# Patient Record
Sex: FEMALE
[De-identification: names, ages, dates, MRNs, and addresses within clinical notes are randomized per-mention and may not be internally consistent; named-entity substitution may affect disease eponyms.]

---

## 2020-04-08 ENCOUNTER — HOSPITAL ENCOUNTER (EMERGENCY)
Dept: HOSPITAL 5 - ED | Age: 50
Discharge: HOME | End: 2020-04-08
Payer: COMMERCIAL

## 2020-04-08 VITALS — SYSTOLIC BLOOD PRESSURE: 156 MMHG | DIASTOLIC BLOOD PRESSURE: 102 MMHG

## 2020-04-08 DIAGNOSIS — R42: ICD-10-CM

## 2020-04-08 DIAGNOSIS — E78.5: ICD-10-CM

## 2020-04-08 DIAGNOSIS — J18.9: Primary | ICD-10-CM

## 2020-04-08 DIAGNOSIS — I10: ICD-10-CM

## 2020-04-08 DIAGNOSIS — F17.200: ICD-10-CM

## 2020-04-08 DIAGNOSIS — R00.0: ICD-10-CM

## 2020-04-08 LAB
ALBUMIN SERPL-MCNC: 4.1 G/DL (ref 3.9–5)
ALT SERPL-CCNC: 74 UNITS/L (ref 7–56)
BASOPHILS # (AUTO): 0.1 K/MM3 (ref 0–0.1)
BASOPHILS NFR BLD AUTO: 0.7 % (ref 0–1.8)
BUN SERPL-MCNC: 9 MG/DL (ref 7–17)
BUN/CREAT SERPL: 13 %
CALCIUM SERPL-MCNC: 9.7 MG/DL (ref 8.4–10.2)
EOSINOPHIL # BLD AUTO: 0 K/MM3 (ref 0–0.4)
EOSINOPHIL NFR BLD AUTO: 0.3 % (ref 0–4.3)
HCT VFR BLD CALC: 48.8 % (ref 30.3–42.9)
HEMOLYSIS INDEX: 7
HGB BLD-MCNC: 16.6 GM/DL (ref 10.1–14.3)
LYMPHOCYTES # BLD AUTO: 2.2 K/MM3 (ref 1.2–5.4)
LYMPHOCYTES NFR BLD AUTO: 22.6 % (ref 13.4–35)
MCHC RBC AUTO-ENTMCNC: 34 % (ref 30–34)
MCV RBC AUTO: 99 FL (ref 79–97)
MONOCYTES # (AUTO): 0.5 K/MM3 (ref 0–0.8)
MONOCYTES % (AUTO): 5.6 % (ref 0–7.3)
PLATELET # BLD: 206 K/MM3 (ref 140–440)
RBC # BLD AUTO: 4.92 M/MM3 (ref 3.65–5.03)

## 2020-04-08 PROCEDURE — 84484 ASSAY OF TROPONIN QUANT: CPT

## 2020-04-08 PROCEDURE — 80053 COMPREHEN METABOLIC PANEL: CPT

## 2020-04-08 PROCEDURE — 93010 ELECTROCARDIOGRAM REPORT: CPT

## 2020-04-08 PROCEDURE — 71275 CT ANGIOGRAPHY CHEST: CPT

## 2020-04-08 PROCEDURE — 71046 X-RAY EXAM CHEST 2 VIEWS: CPT

## 2020-04-08 PROCEDURE — 96360 HYDRATION IV INFUSION INIT: CPT

## 2020-04-08 PROCEDURE — 99284 EMERGENCY DEPT VISIT MOD MDM: CPT

## 2020-04-08 PROCEDURE — 85025 COMPLETE CBC W/AUTO DIFF WBC: CPT

## 2020-04-08 PROCEDURE — 96361 HYDRATE IV INFUSION ADD-ON: CPT

## 2020-04-08 PROCEDURE — 36415 COLL VENOUS BLD VENIPUNCTURE: CPT

## 2020-04-08 PROCEDURE — 93005 ELECTROCARDIOGRAM TRACING: CPT

## 2020-04-08 NOTE — XRAY REPORT
CHEST 2 VIEWS 



INDICATION: 

SOB, cough.



COMPARISON: 

None.



FINDINGS:

Support devices: None.



Heart: Within normal limits. 

Pulmonary vasculature: Normal.

Lungs/pleura: A very subtle wedge-shaped left lower lobe infiltrate with air bronchograms which is be
tter seen on the frontal view. The lungs are otherwise clear. No pleural effusion.  No pneumothorax.



Additional findings: None.



IMPRESSION:

1. A subtle left lower lobe pneumonia partial consolidation.



Signer Name: Alexi Jordan MD 

Signed: 4/8/2020 10:33 AM

Workstation Name: [id number]

## 2020-04-08 NOTE — CAT SCAN REPORT
CTA CHEST WITH IV CONTRAST



INDICATION:

MAIN: chest pain and tachycardia 100CC [id number].



TECHNIQUE:

Axial CT images were obtained through the chest after injection of IV contrast. 3 plane MIP reconstru
ctions were produced. All CT scans at this location are performed using CT dose reduction for ALARA b
y means of automated exposure control. 



COMPARISON:

None available.



FINDINGS:

Pulmonary Arteries: No pulmonary emboli.

Thoracic Aorta: No acute abnormality.

Heart: Normal.

Lungs: No acute air space or interstitial disease. 

Pleura: No pleural effusion. No pneumothorax.

Lymph Nodes: No significant adenopathy.

Additional Findings: None.



Upper Abdomen: No acute findings. There is diffuse hepatic steatosis.



Skeletal Structures: No significant osseous abnormality.



IMPRESSION:

1. No CT evidence for pulmonary embolism. 

2. No acute findings.



Signer Name: Buster Villalta MD 

Signed: 4/8/2020 3:31 PM

Workstation Name: [id number]

## 2020-04-08 NOTE — EVENT NOTE
Face to Face: 


For this encounter I have reviewed the PA/NP documentation, treatment plan, 

medical decision making, and I had face to face time with this patient. 


I evaluated weighted Mrs. Gannon.  With persistent tachycardia and shortness 

of breath I am concerned for pulmonary embolism.  CT angio chest will rule in or

rule out PE and or pneumonia.  Grandmother has history of DVT.  Daughter has 

history of lower extremity vascular disease.

## 2020-04-08 NOTE — EMERGENCY DEPARTMENT REPORT
ED General Adult HPI





- General


Chief complaint: Dyspnea/Respdistress


Stated complaint: ELEVATED HEART RATE


Time Seen by Provider: 20 11:15


Source: patient


Mode of arrival: Wheelchair


Limitations: No Limitations





- History of Present Illness


Initial comments: 





49-year-old  female smoker with past medical history of hypertension, 

hyperlipidemia presents emergency department complaining of having a couple day 

history of not progressing shortness of breath which is worsened with with 

physical activity associated with a dry cough and occasional dizziness.  Reports

no fever, chills, sweats, hemoptysis, hematemesis, hematochezia, no foreign 

travel, no known contact with the coronavirus.  She was initially seen her 

primary care provider's office today Dr. Asaf Morrison she was found to be 

tachycardic. 


Radiation: non-radiation


Consistency: constant


Improves with: none


Worsens with: none


Associated Symptoms: cough.  denies: confusion, nausea/vomiting, syncope, 

weakness





- Related Data


                                  Previous Rx's











 Medication  Instructions  Recorded  Last Taken  Type


 


Amoxicillin/Potassium Clav 1 each PO BID #20 tablet 20 Unknown Rx





[Augmentin 875-125 Tablet]    


 


Azithromycin [Zithromax TAB] 500 mg PO QDAY #5 tablet 20 Unknown Rx


 


Benzonatate [Tessalon Perles] 100 mg PO Q8HR #20 capsule 20 Unknown Rx











                                    Allergies











Allergy/AdvReac Type Severity Reaction Status Date / Time


 


codeine Allergy  Anaphylaxis Verified 20 09:58














ED Review of Systems


ROS: 


Stated complaint: ELEVATED HEART RATE


Other details as noted in HPI





Comment: All other systems reviewed and negative





ED Past Medical Hx





- Past Medical History


Previous Medical History?: Yes


Hx Hypertension: Yes


Additional medical history: IBS





- Social History


Smoking Status: Current Every Day Smoker


Substance Use Type: None





- Medications


Home Medications: 


                                Home Medications











 Medication  Instructions  Recorded  Confirmed  Last Taken  Type


 


Amoxicillin/Potassium Clav 1 each PO BID #20 tablet 20  Unknown Rx





[Augmentin 875-125 Tablet]     


 


Azithromycin [Zithromax TAB] 500 mg PO QDAY #5 tablet 20  Unknown Rx


 


Benzonatate [Tessalon Perles] 100 mg PO Q8HR #20 capsule 20  Unknown Rx














ED Physical Exam





- General


Limitations: No Limitations


General appearance: alert, in no apparent distress





- Head


Head exam: Present: atraumatic, normocephalic





- Eye


Eye exam: Present: normal appearance, PERRL, EOMI





- ENT


ENT exam: Present: normal exam, normal orophraynx, mucous membranes moist





- Neck


Neck exam: Present: normal inspection





- Respiratory


Respiratory exam: Present: normal lung sounds bilaterally.  Absent: respiratory 

distress, wheezes, chest wall tenderness, accessory muscle use





- Cardiovascular


Cardiovascular Exam: Present: regular rate, normal rhythm.  Absent: systolic 

murmur, diastolic murmur, rubs, gallop





- GI/Abdominal


GI/Abdominal exam: Present: soft, normal bowel sounds.  Absent: distended, 

tenderness, guarding, hyperactive bowel sounds





- Extremities Exam


Extremities exam: Present: normal inspection, full ROM, normal capillary refill





- Back Exam


Back exam: Present: normal inspection





- Neurological Exam


Neurological exam: Present: alert, oriented X3





- Psychiatric


Psychiatric exam: Present: normal affect, normal mood





- Skin


Skin exam: Present: warm, dry, intact, normal color.  Absent: rash





ED Course





                                   Vital Signs











  20





  10:01 12:20


 


Temperature 98.5 F 


 


Pulse Rate 124 H 


 


Respiratory 18 20





Rate  


 


Blood Pressure 167/106 


 


O2 Sat by Pulse 98 99





Oximetry  














ED Medical Decision Making





- Lab Data


Result diagrams: 


                                 20 12:08





                                 20 12:08





- EKG Data


EKG shows normal: sinus rhythm


Rate: tachycardia





- EKG Data


When compared to previous EKG there are: no significant change


Interpretation: normal EKG





- Radiology Data


Radiology results: report reviewed





[place] [address] 





Cat Scan Report 


Signed 





 Patient: CHANTAL MERINO MR#: [id number] 


 [id number] 


 : 1970 Acct:[id number] 





 Age/Sex: 49 / F ADM Date: 20 





 Loc: ED 


 Attending Dr: 








 Ordering Physician: MG MARTE 


 Date of Service: 20 


 Procedure(s): CT angio chest 


 Accession Number(s): [id number] 





 cc: GM MARTE 








 CTA CHEST WITH IV CONTRAST 





INDICATION: 


MAIN: chest pain and tachycardia 100CC [id number]. 





TECHNIQUE: 


Axial CT images were obtained through the chest after injection of IV contrast. 

3 plane MIP 


 reconstructions were produced. All CT scans at this location are performed 

using CT dose reduction 


 for ALARA by means of automated exposure control. 





COMPARISON: 


None available. 





FINDINGS: 


Pulmonary Arteries: No pulmonary emboli. 


Thoracic Aorta: No acute abnormality. 


Heart: Normal. 


Lungs: No acute air space or interstitial disease. 


Pleura: No pleural effusion. No pneumothorax. 


Lymph Nodes: No significant adenopathy. 


Additional Findings: None. 





Upper Abdomen: No acute findings. There is diffuse hepatic steatosis. 





Skeletal Structures: No significant osseous abnormality. 





IMPRESSION: 


1. No CT evidence for pulmonary embolism. 


2. No acute findings. 





Signer Name: Buster Villalta MD 


Signed: 2020 3:31 PM 


Workstation Name: [id number] 








 Transcribed By: AD 


 Dictated By: Buster Villalta MD 


 Electronically Authenticated By: Buster Villalta MD 


 Signed Date/Time: 20 153 











 DD/DT: 20 


 TD/TT: 





- Medical Decision Making





49-year-old female presents emerge department secondary to tachycardia 

associated with physical exertion associated with some dizziness laboratory data

did not reveal any significant findings and the actual cause this point is 

unknown she had tachycardia remained persistent with ambulation and because 

because of the complaints coupled with advised examination a CT scan G scan test

was of the chest was also obtained but was also normal.  Chest x-ray does yield 

some questionable left lower lobe pneumonia we will treat her for this process 

and have her follow-up for reevaluation with her primary care provider and have 

her to be placed on a quarantine in the meantime she is afebrile and has 

remained afebrile


Critical care attestation.: 


If time is entered above; I have spent that time in minutes in the direct care 

of this critically ill patient, excluding procedure time.








ED Disposition


Clinical Impression: 


 Tachycardia, Left lower lobe pneumonia





Disposition: DC-01 TO HOME OR SELFCARE


Is pt being admited?: No


Does the pt Need Aspirin: No


Condition: Stable


Instructions:  Bacterial Pneumonia (ED), Community-acquired Pneumonia (ED)


Prescriptions: 


Amoxicillin/Potassium Clav [Augmentin 875-125 Tablet] 1 each PO BID #20 tablet


Benzonatate [Tessalon Perles] 100 mg PO Q8HR #20 capsule


Azithromycin [Zithromax TAB] 500 mg PO QDAY #5 tablet


Referrals: 


PRIMARY CARE,MD [Primary Care Provider] - 3-5 Days

## 2020-04-27 ENCOUNTER — HOSPITAL ENCOUNTER (OUTPATIENT)
Dept: HOSPITAL 5 - XRAY | Age: 50
Discharge: HOME | End: 2020-04-27
Attending: INTERNAL MEDICINE
Payer: COMMERCIAL

## 2020-04-27 DIAGNOSIS — J18.9: Primary | ICD-10-CM

## 2020-04-27 PROCEDURE — 71046 X-RAY EXAM CHEST 2 VIEWS: CPT

## 2020-04-27 NOTE — XRAY REPORT
CHEST 2 VIEWS



INDICATION: 

J18.9Pneumonia, unspecified organism.



COMPARISON: 

April 8, 2020



FINDINGS:

Support devices: None.



Heart: Within normal limits.

Lungs: No acute air space or interstitial disease.

Pleura: No significant pleural effusion. No pneumothorax.



Additional findings: None.



IMPRESSION:

1. No acute findings.



Signer Name: Jesus Mccarthy MD 

Signed: 4/27/2020 2:00 PM

Workstation Name: [id number]

## 2022-04-18 ENCOUNTER — CLAIMS CREATED FROM THE CLAIM WINDOW (OUTPATIENT)
Dept: URBAN - METROPOLITAN AREA CLINIC 109 | Facility: CLINIC | Age: 52
End: 2022-04-18
Payer: COMMERCIAL

## 2022-04-18 ENCOUNTER — WEB ENCOUNTER (OUTPATIENT)
Dept: URBAN - METROPOLITAN AREA CLINIC 109 | Facility: CLINIC | Age: 52
End: 2022-04-18

## 2022-04-18 VITALS
WEIGHT: 227 LBS | BODY MASS INDEX: 37.82 KG/M2 | HEART RATE: 90 BPM | DIASTOLIC BLOOD PRESSURE: 61 MMHG | SYSTOLIC BLOOD PRESSURE: 91 MMHG | TEMPERATURE: 97.7 F | HEIGHT: 65 IN

## 2022-04-18 DIAGNOSIS — R74.8 ELEVATED LIVER ENZYMES: ICD-10-CM

## 2022-04-18 DIAGNOSIS — K80.20 GALLSTONES: ICD-10-CM

## 2022-04-18 PROBLEM — 235919008: Status: ACTIVE | Noted: 2022-04-18

## 2022-04-18 PROCEDURE — 99204 OFFICE O/P NEW MOD 45 MIN: CPT | Performed by: INTERNAL MEDICINE

## 2022-04-18 PROCEDURE — 99214 OFFICE O/P EST MOD 30 MIN: CPT | Performed by: INTERNAL MEDICINE

## 2022-04-18 RX ORDER — OMEPRAZOLE 40 MG/1
TAKE 1 CAPSULE (40 MG) BY ORAL ROUTE ONCE DAILY CAPSULE, DELAYED RELEASE PELLETS ORAL 1
Qty: 30 | Refills: 3 | Status: ACTIVE | COMMUNITY
Start: 2017-12-08

## 2022-04-18 NOTE — HPI-TODAY'S VISIT:
The patient is referred for n/v, elevated liver enzymes and gallstones. She has been unable to eat well for several months and has bilious. Gallstones were discovered by u/s a few months ago revealing several small stones and a fatty liver, but no overt evidence of cirrhosis. No dilated ducts. CT scan has also been done- results requested. She thinks she had a HIDA scan as well revealing a 21% EF, but no cystic duct obstruction.  She saw a surgeon, Dr. Gillian Burleson and a second opinion requested before surgery. ETOH intake described as a shot of whiskey every night. She has recently stopped.  She has had pedal edema in recent months. Smokes 1 PPD of cigarettes. EGD in 2020 revealed gastritis. No evidence of celiac disease or h pylori on biopsies.  Colonoscopy in 2020 revealed a diminutive tubular adenoma. She has seen a surgeon. LFTs notable flr ALT 53, , Alk phos 341, T. bili 1.3 recently. Albumin is 3.4.

## 2022-04-20 LAB
A/G RATIO: 1
ACTIN (SMOOTH MUSCLE) ANTIBODY: 7
ALBUMIN: 3.1
ALKALINE PHOSPHATASE: 499
ALT (SGPT): 58
ANA DIRECT: NEGATIVE
AST (SGOT): 158
BASO (ABSOLUTE): 0.1
BASOS: 1
BILIRUBIN, TOTAL: 1.9
BUN/CREATININE RATIO: 8
BUN: 6
CALCIUM: 8.4
CARBON DIOXIDE, TOTAL: 25
CHLORIDE: 84
CREATININE: 0.73
EGFR: 101
EOS (ABSOLUTE): 0.1
EOS: 1
GLOBULIN, TOTAL: 3
GLUCOSE: 120
HBSAG SCREEN: NEGATIVE
HEMATOCRIT: 38.4
HEMATOLOGY COMMENTS:: (no result)
HEMOGLOBIN: 12.9
HEP B CORE AB, TOT: NEGATIVE
HEP C VIRUS AB: <0.1
IMMATURE CELLS: (no result)
IMMATURE GRANS (ABS): 0.1
IMMATURE GRANULOCYTES: 1
LYMPHS (ABSOLUTE): 3.3
LYMPHS: 24
MCH: 33.6
MCHC: 33.6
MCV: 100
MITOCHONDRIAL (M2) ANTIBODY: <20
MONOCYTES(ABSOLUTE): 0.9
MONOCYTES: 6
NEUTROPHILS (ABSOLUTE): 9.4
NEUTROPHILS: 67
NRBC: (no result)
PLATELETS: 234
POTASSIUM: 4.3
PROTEIN, TOTAL: 6.1
RBC: 3.84
RDW: 16.8
SODIUM: 127
WBC: 13.8

## 2022-04-22 ENCOUNTER — TELEPHONE ENCOUNTER (OUTPATIENT)
Dept: URBAN - METROPOLITAN AREA CLINIC 92 | Facility: CLINIC | Age: 52
End: 2022-04-22

## 2022-04-29 ENCOUNTER — OUT OF OFFICE VISIT (OUTPATIENT)
Dept: URBAN - METROPOLITAN AREA MEDICAL CENTER 16 | Facility: MEDICAL CENTER | Age: 52
End: 2022-04-29
Payer: COMMERCIAL

## 2022-04-29 DIAGNOSIS — Z90.49 ABSENCE OF GALLBLADDER: ICD-10-CM

## 2022-04-29 DIAGNOSIS — R18.8 ABDOMINAL ASCITES: ICD-10-CM

## 2022-04-29 DIAGNOSIS — K76.0 FATTY (CHANGE OF) LIVER: ICD-10-CM

## 2022-04-29 PROCEDURE — G8427 DOCREV CUR MEDS BY ELIG CLIN: HCPCS | Performed by: INTERNAL MEDICINE

## 2022-04-29 PROCEDURE — 99222 1ST HOSP IP/OBS MODERATE 55: CPT | Performed by: INTERNAL MEDICINE

## 2022-06-09 ENCOUNTER — DASHBOARD ENCOUNTERS (OUTPATIENT)
Age: 52
End: 2022-06-09

## 2022-06-29 ENCOUNTER — OFFICE VISIT (OUTPATIENT)
Dept: URBAN - METROPOLITAN AREA CLINIC 109 | Facility: CLINIC | Age: 52
End: 2022-06-29

## 2022-06-29 RX ORDER — OMEPRAZOLE 40 MG/1
TAKE 1 CAPSULE (40 MG) BY ORAL ROUTE ONCE DAILY CAPSULE, DELAYED RELEASE PELLETS ORAL 1
Qty: 30 | Refills: 3 | COMMUNITY
Start: 2017-12-08

## 2022-09-02 ENCOUNTER — HOSPITAL ENCOUNTER (INPATIENT)
Dept: HOSPITAL 5 - ED | Age: 52
LOS: 6 days | Discharge: HOME HEALTH | DRG: 432 | End: 2022-09-08
Attending: STUDENT IN AN ORGANIZED HEALTH CARE EDUCATION/TRAINING PROGRAM | Admitting: INTERNAL MEDICINE
Payer: COMMERCIAL

## 2022-09-02 DIAGNOSIS — E87.5: ICD-10-CM

## 2022-09-02 DIAGNOSIS — E66.2: ICD-10-CM

## 2022-09-02 DIAGNOSIS — J81.0: ICD-10-CM

## 2022-09-02 DIAGNOSIS — E87.1: ICD-10-CM

## 2022-09-02 DIAGNOSIS — K72.00: ICD-10-CM

## 2022-09-02 DIAGNOSIS — Z90.710: ICD-10-CM

## 2022-09-02 DIAGNOSIS — I10: ICD-10-CM

## 2022-09-02 DIAGNOSIS — E72.20: ICD-10-CM

## 2022-09-02 DIAGNOSIS — F10.20: ICD-10-CM

## 2022-09-02 DIAGNOSIS — K70.31: Primary | ICD-10-CM

## 2022-09-02 DIAGNOSIS — R73.9: ICD-10-CM

## 2022-09-02 DIAGNOSIS — E87.70: ICD-10-CM

## 2022-09-02 DIAGNOSIS — F17.200: ICD-10-CM

## 2022-09-02 DIAGNOSIS — Z90.49: ICD-10-CM

## 2022-09-02 DIAGNOSIS — Z20.822: ICD-10-CM
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
## 2022-09-02 DIAGNOSIS — G61.0: ICD-10-CM

## 2022-09-02 DIAGNOSIS — Z82.49: ICD-10-CM

## 2022-09-02 DIAGNOSIS — J96.01: ICD-10-CM

## 2022-09-02 LAB
ALBUMIN SERPL-MCNC: 2.7 G/DL (ref 3.9–5)
ALT SERPL-CCNC: 37 UNITS/L (ref 7–56)
BASOPHILS # (AUTO): 0.1 K/MM3 (ref 0–0.1)
BASOPHILS NFR BLD AUTO: 0.6 % (ref 0–1.8)
BUN SERPL-MCNC: 12 MG/DL (ref 7–17)
BUN/CREAT SERPL: 13 %
CALCIUM SERPL-MCNC: 8.7 MG/DL (ref 8.4–10.2)
EOSINOPHIL # BLD AUTO: 0.1 K/MM3 (ref 0–0.4)
EOSINOPHIL NFR BLD AUTO: 1.2 % (ref 0–4.3)
HCT VFR BLD CALC: 36.8 % (ref 30.3–42.9)
HEMOLYSIS INDEX: 4
HGB BLD-MCNC: 12.3 GM/DL (ref 10.1–14.3)
LYMPHOCYTES # BLD AUTO: 2.6 K/MM3 (ref 1.2–5.4)
LYMPHOCYTES NFR BLD AUTO: 25.3 % (ref 13.4–35)
MCHC RBC AUTO-ENTMCNC: 33 % (ref 30–34)
MCV RBC AUTO: 89 FL (ref 79–97)
MONOCYTES # (AUTO): 1 K/MM3 (ref 0–0.8)
MONOCYTES % (AUTO): 9.9 % (ref 0–7.3)
PLATELET # BLD: 251 K/MM3 (ref 140–440)
RBC # BLD AUTO: 4.15 M/MM3 (ref 3.65–5.03)

## 2022-09-02 PROCEDURE — 71045 X-RAY EXAM CHEST 1 VIEW: CPT

## 2022-09-02 PROCEDURE — 93005 ELECTROCARDIOGRAM TRACING: CPT

## 2022-09-02 PROCEDURE — 87116 MYCOBACTERIA CULTURE: CPT

## 2022-09-02 PROCEDURE — 84484 ASSAY OF TROPONIN QUANT: CPT

## 2022-09-02 PROCEDURE — 88112 CYTOPATH CELL ENHANCE TECH: CPT

## 2022-09-02 PROCEDURE — 87205 SMEAR GRAM STAIN: CPT

## 2022-09-02 PROCEDURE — 82040 ASSAY OF SERUM ALBUMIN: CPT

## 2022-09-02 PROCEDURE — 86140 C-REACTIVE PROTEIN: CPT

## 2022-09-02 PROCEDURE — 49083 ABD PARACENTESIS W/IMAGING: CPT

## 2022-09-02 PROCEDURE — 80048 BASIC METABOLIC PNL TOTAL CA: CPT

## 2022-09-02 PROCEDURE — 82140 ASSAY OF AMMONIA: CPT

## 2022-09-02 PROCEDURE — 89051 BODY FLUID CELL COUNT: CPT

## 2022-09-02 PROCEDURE — 80053 COMPREHEN METABOLIC PANEL: CPT

## 2022-09-02 PROCEDURE — 85007 BL SMEAR W/DIFF WBC COUNT: CPT

## 2022-09-02 PROCEDURE — 85730 THROMBOPLASTIN TIME PARTIAL: CPT

## 2022-09-02 PROCEDURE — 85025 COMPLETE CBC W/AUTO DIFF WBC: CPT

## 2022-09-02 PROCEDURE — 83605 ASSAY OF LACTIC ACID: CPT

## 2022-09-02 PROCEDURE — 85379 FIBRIN DEGRADATION QUANT: CPT

## 2022-09-02 PROCEDURE — 82947 ASSAY GLUCOSE BLOOD QUANT: CPT

## 2022-09-02 PROCEDURE — 84100 ASSAY OF PHOSPHORUS: CPT

## 2022-09-02 PROCEDURE — 83615 LACTATE (LD) (LDH) ENZYME: CPT

## 2022-09-02 PROCEDURE — 84160 ASSAY OF PROTEIN ANY SOURCE: CPT

## 2022-09-02 PROCEDURE — 85610 PROTHROMBIN TIME: CPT

## 2022-09-02 PROCEDURE — 84145 PROCALCITONIN (PCT): CPT

## 2022-09-02 PROCEDURE — 83735 ASSAY OF MAGNESIUM: CPT

## 2022-09-02 PROCEDURE — 70450 CT HEAD/BRAIN W/O DYE: CPT

## 2022-09-02 PROCEDURE — 80320 DRUG SCREEN QUANTALCOHOLS: CPT

## 2022-09-02 PROCEDURE — 93970 EXTREMITY STUDY: CPT

## 2022-09-02 PROCEDURE — 82962 GLUCOSE BLOOD TEST: CPT

## 2022-09-02 PROCEDURE — 87040 BLOOD CULTURE FOR BACTERIA: CPT

## 2022-09-02 PROCEDURE — 36415 COLL VENOUS BLD VENIPUNCTURE: CPT

## 2022-09-02 PROCEDURE — 85027 COMPLETE CBC AUTOMATED: CPT

## 2022-09-02 PROCEDURE — 94760 N-INVAS EAR/PLS OXIMETRY 1: CPT

## 2022-09-02 PROCEDURE — 74177 CT ABD & PELVIS W/CONTRAST: CPT

## 2022-09-02 PROCEDURE — G0480 DRUG TEST DEF 1-7 CLASSES: HCPCS

## 2022-09-02 PROCEDURE — U0003 INFECTIOUS AGENT DETECTION BY NUCLEIC ACID (DNA OR RNA); SEVERE ACUTE RESPIRATORY SYNDROME CORONAVIRUS 2 (SARS-COV-2) (CORONAVIRUS DISEASE [COVID-19]), AMPLIFIED PROBE TECHNIQUE, MAKING USE OF HIGH THROUGHPUT TECHNOLOGIES AS DESCRIBED BY CMS-2020-01-R: HCPCS

## 2022-09-02 RX ADMIN — CEFTRIAXONE SODIUM SCH MLS/HR: 2 INJECTION, POWDER, FOR SOLUTION INTRAMUSCULAR; INTRAVENOUS at 17:23

## 2022-09-02 RX ADMIN — Medication SCH ML: at 21:06

## 2022-09-02 RX ADMIN — OXYCODONE HYDROCHLORIDE AND ACETAMINOPHEN SCH: 500 TABLET ORAL at 21:06

## 2022-09-02 RX ADMIN — Medication SCH: at 21:06

## 2022-09-02 RX ADMIN — HEPARIN SODIUM SCH UNIT: 5000 INJECTION, SOLUTION INTRAVENOUS; SUBCUTANEOUS at 21:06

## 2022-09-02 RX ADMIN — METHYLPREDNISOLONE SODIUM SUCCINATE SCH MG: 40 INJECTION, POWDER, FOR SOLUTION INTRAMUSCULAR; INTRAVENOUS at 21:06

## 2022-09-02 NOTE — CAT SCAN REPORT
CT HEAD WITHOUT CONTRAST



INDICATION / CLINICAL INFORMATION:

stroke.



TECHNIQUE:

All CT scans at this location are performed using CT dose reduction for ALARA by means of automated e
xposure control. 



COMPARISON:

Head CT 2/13/2022



FINDINGS:

HEMORRHAGE: No evidence of intracranial hemorrhage or extra-axial fluid collection.

EXTRA-AXIAL SPACES: Cortical sulci, sylvian fissures and basilar cisterns have an unremarkable appear
ance.

VENTRICULAR SYSTEM: The third and lateral ventricles are of normal size and configuration.

CEREBRAL PARENCHYMA: Periventricular and deep white matter lucency is noted. This is likely secondary
 to microvascular ischemic change. This is an unusual finding in a 51-year-old individual. There is a
 history of hypertension, diabetes, cigarette smoking or renal failure Similar findings were present 
on prior study 2/13/2022. No additional regions of abnormal brain parenchymal attenuation are identif
ied. 

MIDLINE SHIFT OR HERNIATION: There is no mass effect.

CEREBELLUM / BRAINSTEM: Brainstem and cerebellum have an unremarkable appearance.

MIDLINE STRUCTURES:No abnormalities of the pituitary gland or pineal region are identified.

INTRACRANIAL VESSELS:No abnormalities are identified on this noncontrast head CT.

ORBITS: visualized portions of the orbits have an unremarkable appearance.

SOFT TISSUES of HEAD: No significant abnormality.

CALVARIUM: Evaluation of bone windows reveals no abnormalities.

PARANASAL SINUSES / MASTOID AIR CELLS: Visualized portions of the paranasal sinuses are free from inf
lammatory mucosal disease. Mastoid air cells are normally pneumatized.



ADDITIONAL FINDINGS: None.



IMPRESSION:

1. No acute intracranial abnormality. No significant interval change compared to previous study 2/13/
2022.



Signer Name: Jhoan Mak MD 

Signed: 9/2/2022 4:51 PM

Workstation Name: [id number]

## 2022-09-02 NOTE — CAT SCAN REPORT
CT ABDOMEN AND PELVIS WITH CONTRAST



INDICATION / CLINICAL INFORMATION: abdominal distention.



TECHNIQUE:

Axial CT images were obtained through the abdomen and pelvis after 100 cc of Omnipaque 350 IV contras
t. Sagittal and coronal reformatted images. All CT scans at this location are performed using CT dose
 reduction for ALARA by means of automated exposure control. 



COMPARISON: No relevant comparison



FINDINGS:

LOWER CHEST: Small bilateral layering pleural effusions and mild bibasilar atelectatic changes are pr
esent.

LIVER: Slightly nodular configuration to the liver which could represent mild cirrhosis. No focal ruma
er lesion.

GALLBLADDER: Surgically absent.  

BILE DUCTS: The common bile duct stent is in position. No biliary dilatation.

PANCREAS: No significant abnormality.

SPLEEN: No significant abnormality.

ADRENALS: No significant abnormality.

RIGHT KIDNEY and URETER: No significant abnormality.

LEFT KIDNEY and URETER: No significant abnormality.



STOMACH and SMALL BOWEL: No significant abnormality. 

COLON: No significant abnormality. 

APPENDIX: No significant abnormality.  

PERITONEUM: There is moderate ascites throughout the abdomen. No free air. No fluid collection.

LYMPH NODES: No significant adenopathy.

AORTA and ARTERIES: Mild atherosclerotic calcification without acute abnormality. 

IVC and VEINS: No significant abnormality.



URINARY BLADDER: No significant abnormality.

REPRODUCTIVE ORGANS: Hysterectomy. No adnexal abnormality is appreciated.



ADDITIONAL FINDINGS: None.



SKELETAL SYSTEM: No significant abnormality.



IMPRESSION:

 Moderate ascites. Small bilateral layering pleural effusions.

Question mild cirrhotic configuration of the liver. No splenomegaly.

Surgical changes as described.

No acute inflammatory process is appreciated.



Signer Name: Fortunato Marcos Jr, MD 

Signed: 9/2/2022 1:11 PM

Workstation Name: [id number]

## 2022-09-02 NOTE — HISTORY AND PHYSICAL REPORT
History of Present Illness


Chief complaint: 


My stomach is getting bigger





History of present illness: 


52 YO Female with ETOH Dependence complicated by Cirrhosis S/P Biliary Stent 

Placement, HTN, Nicotine Dependence, Guillan [place] Syndrome, IBS, Nicotine 

Dependence, Obesity Hypoventilation Syndrome presents to ED for evaluation.  

Patient reports "my stomach is getting bigger".  Patient states that she has 

experienced increased abdominal distention over the past 2 weeks with worsening 

symptoms over the same timeframe.  Patient presented to her primary care 

physician's office and was instructed to seek further care at [facility].  EMS was notified and upon arrival the patient was found to be in dis

tress and subsequently transported to [facility] for further care and evaluation of the

aforementioned symptoms.  The patient was seen and evaluated in the emergency 

department.  All lab and imaging studies reviewed.  The patient was found to 

have a systolic blood pressure in the 90s.  Patient underwent chest x-ray which 

revealed left lower lobe pneumonia, hyponatremia syndrome, hepatic 

encephalopathy.  Patient admitted to IMCU due to increased risk of worsening 

symptoms and for medical stabilization and initiated on pneumonia protocol as 

well as coronavirus protocol.  Patient denies fever, chills, chest pain, 

palpitation, productive cough, skin rash, recent contact, limb weakness, 

unilateral leg swelling, calf pain, individual/family history of 

DVT/PE/bleeding/blood clotting disorders, or known exposure to COVID-19.  Prior 

admission on 2/13/2022 reviewed.  All medication listed at time of admission has

been reconciled.  Advanced care planning conducted in ED.  GI team consulted in 

ED.








Past History


Past Medical History: hypertension


Past Surgical History: Other (Biliary stent placement)


Social history: , lives with family


Family history: hypertension





Medications and Allergies


                                    Allergies











Allergy/AdvReac Type Severity Reaction Status Date / Time


 


codeine Allergy  Anaphylaxis Verified 09/02/22 10:11











                                Home Medications











 Medication  Instructions  Recorded  Confirmed  Last Taken  Type


 


Benzonatate [Tessalon Perles] 100 mg PO Q8HR #20 capsule 04/08/20 02/14/22 

Unknown Rx


 


Magnesium Oxide 400 mg PO DAILY 02/15/22 02/15/22 02/13/22 09:00 History


 


Metoprolol Xl [Metoprolol 50 mg PO QDAY 02/15/22 02/15/22 02/13/22 09:00 History





SUCCINATE ER TAB]     


 


Nortriptyline HCl [Pamelor] 75 mg PO DAILY 02/15/22 02/15/22 02/13/22 09:00 

History


 


Omeprazole 40 mg PO DAILY 02/15/22 02/15/22 02/13/22 09:00 History


 


Valsartan [Diovan] 40 mg PO DAILY 02/15/22 02/15/22 02/13/22 09:00 History


 


dilTIAZem HCl [Diltiazem 24Hr  mg PO QDAY 02/15/22 02/15/22 02/13/22 09:00

 History





(Cd)]     


 


Amoxicillin/Potassium Clav 1 each PO BID 3 Days #6 tab 02/16/22  Unknown Rx





[Augmentin 875-125 Tablet]     


 


Azithromycin 500 mg PO DAILY 2 Days #2 tab 02/16/22  Unknown Rx











Active Meds: 


Active Medications





Azithromycin (Azithromycin 250 Mg Tab)  500 mg PO QDAY [facility]; Protocol


Hydromorphone HCl (Hydromorphone 0.5 Mg/0.5 Ml Inj)  0.5 mg IV Q23H PRN


   PRN Reason: Pain , Severe (7-10)


Ceftriaxone Sodium (Rocephin/Ns 2 Gm/100 Ml)  2 gm in 100 mls @ 200 mls/hr IV 

Q24H [facility]; Protocol


Ibuprofen (Ibuprofen 600 Mg Tab)  600 mg PO Q6H PRN


   PRN Reason: Pain, Mild (1-3)


Magnesium Oxide (Magnesium Oxide 400 Mg Tab)  400 mg PO DAILY [facility]


Miscellaneous Medication (Nortriptyline Hcl [Pamelor])  75 mg PO DAILY [facility]


Miscellaneous Medication (Omeprazole [Omeprazole])  40 mg PO DAILY [facility]


Oxycodone/Acetaminophen (Oxycodone /Acetaminophen 5-325mg Tab)  1 tab PO Q16H 

PRN


   PRN Reason: Pain, Moderate (4-6)


Sodium Chloride (Sodium Chloride 0.9% 10 Ml Flush Syringe)  10 ml IV BID [facility]


Sodium Chloride (Sodium Chloride 0.9% 10 Ml Flush Syringe)  10 ml IV PRN PRN


   PRN Reason: LINE FLUSH


   Stop: 09/07/22 15:33











Review of Systems


Constitutional: no weight loss, no weight gain, no fever, no chills


Ears, nose, mouth and throat: no ear pain, no ear discharge, no tinnitis, no 

decreased hearing, no nose pain, no nasal discharge


Breasts: no change in shape


Cardiovascular: no chest pain, no orthopnea, no palpitations, no rapid/irregular

 heart beat, no edema


Respiratory: no cough, no cough with sputum, no hemoptysis, no shortness of 

breath


Gastrointestinal: other (Abdominal distention), no abdominal pain, no nausea, no

 vomiting, no diarrhea, no constipation


Genitourinary Female: no pelvic pain, no flank pain, no dysuria, no urinary 

frequency, no urgency


Rectal: no pain, no incontinence, no bleeding


Musculoskeletal: no neck stiffness, no neck pain, no shooting arm pain, no arm 

numbness/tingling, no low back pain


Integumentary: no rash, no pruritis, no sores, no wounds, no jaundice


Neurological: no head injury, no parathesias, no numbness, no tingling, no 

seizures, no syncope


Psychiatric: no anxiety, no memory loss, no insomnia, no change in appetite, no 

change in libido, no suicidal ideation


Endocrine: no cold intolerance, no heat intolerance, no polyphagia, no polyuria,

 no excessive sweating, no flushing


Hematologic/Lymphatic: no easy bruising, no easy bleeding


Allergic/Immunologic: no urticaria, no allergic rhinitis





Exam





- Constitutional


Vitals: 


                                        











Temp Pulse Resp BP Pulse Ox


 


 97.7 F   106 H  13   94/62   93 


 


 09/02/22 10:08  09/02/22 15:15  09/02/22 15:15  09/02/22 15:15  09/02/22 15:15











General appearance: Present: mild distress, obese





- EENT


Eyes: Present: PERRL


ENT: hearing intact, clear oral mucosa





- Neck


Neck: Present: supple, normal ROM





- Respiratory


Respiratory effort: normal


Respiratory: bilateral: CTA





- Cardiovascular


Heart Sounds: Present: S1 & S2.  Absent: rub, click





- Extremities


Extremities: pulses symmetrical, No edema


Peripheral Pulses: within normal limits





- Abdominal


General gastrointestinal: Present: soft, non-tender, non-distended, normal bowel

 sounds


Female genitourinary: Present: normal





- Integumentary


Integumentary: Present: clear, warm, dry





- Musculoskeletal


Musculoskeletal: gait normal, strength equal bilaterally





- Psychiatric


Psychiatric: appropriate mood/affect, cooperative





- Neurologic


Neurologic: CNII-XII intact, no focal deficits, moves all extremities, no gait 

normal





HEART Score





- HEART Score


Troponin: 


                                        











Troponin T  < 0.010 ng/mL (0.00-0.029)   09/02/22  Unknown














Results





- Labs


CBC & Chem 7: 


                                09/02/22 Unknown





                                09/02/22 Unknown


Labs: 


                              Abnormal lab results











  09/02/22 09/02/22 09/02/22 Range/Units





  14:17 Unknown Unknown 


 


Mono % (Auto)   9.9 H   (0.0-7.3)  %


 


Mono # (Auto)   1.0 H   (0.0-0.8)  K/mm3


 


Sodium    126 L  (137-145)  mmol/L


 


Potassium    5.2 H  (3.6-5.0)  mmol/L


 


Chloride    90.6 L  ()  mmol/L


 


Glucose    113 H  ()  mg/dL


 


Total Bilirubin    1.30 H  (0.1-1.2)  mg/dL


 


AST    106 H  (5-40)  units/L


 


Alkaline Phosphatase    305 H  ()  units/L


 


Ammonia  61.0 H    (25-60)  umol/L


 


Total Protein    6.2 L  (6.3-8.2)  g/dL


 


Albumin    2.7 L  (3.9-5)  g/dL














Assessment and Plan





- Patient Problems


(1) Pneumonia


Current Visit: Yes   Status: Acute   


Plan to address problem: 


[facility] protocol: Chest x-ray, CBC, CMP, supplemental oxygen, pulse oximetry, 

nebulizer therapy, pulmonary toilet, IV antibiotic therapy.








(2) Alcoholic cirrhosis of liver with ascites


Current Visit: Yes   Status: Acute   


Plan to address problem: 


Supportive care, GI team consulted.  Will consider diagnostic versus therapeutic

 paracentesis as clinically indicated.








(3) Alcohol dependence


Current Visit: Yes   Status: Acute   


Plan to address problem: 


CIWA protocol: Thiamine, folic acid, multivitamin daily.








(4) Nicotine dependence


Current Visit: Yes   Status: Acute   


Qualifiers: 


   Nicotine product type: cigarettes   Substance use status: in withdrawal   

Qualified Code(s): F17.213 - Nicotine dependence, cigarettes, with withdrawal   


Plan to address problem: 


Smoking cessation counseling, supportive care, behavior change counseling, +15 

minutes.








(5) Obesity hypoventilation syndrome


Current Visit: Yes   Status: Acute   


Plan to address problem: 


Balanced diet, increase physical activity discharge, outpatient pulmonary 

follow-up for sleep study.  Noninvasive positive pressure ventilation as 

clinically indicated.








(6) Hyponatremia syndrome


Current Visit: Yes   Status: Acute   


Plan to address problem: 


IV fluid resuscitation therapy, BMP, repeat BMP in a.m.








(7) Suspected 2019 novel coronavirus infection


Current Visit: Yes   Status: Acute   


Plan to address problem: 


Coronavirus protocol: Vitamin C therapy, vitamin D therapy, zinc therapy, IV 

antibiotic therapy, IV steroid therapy, supportive care.  Supplemental oxygen, 

pulse oximetry, noninvasive positive pressure ventilation.








(8) DVT prophylaxis


Current Visit: Yes   Status: Acute   


Plan to address problem: 


SCDs bilateral lower extremities while in bed, prophylactic anticoagulation








(9) Advance care planning


Current Visit: Yes   Status: Acute   


Plan to address problem: 


Disease education done, care plan discussed, diagnoses discussed, prognosis 

discussed, patient is full code.  Patient and  acknowledged understanding

 and agreement with care plan, +30 minutes.








(10) Preventative health care


Current Visit: Yes   Status: Acute   


Plan to address problem: 


Patient and  counseled regarding increase physical activity discharge, 

wrist factor reduction, outpatient follow-up with primary care physician for all

 age and risk factor appropriate screening test.  +30 minutes.

## 2022-09-02 NOTE — XRAY REPORT
XR chest 1V ap



INDICATION / CLINICAL INFORMATION: SOB.



COMPARISON: 2/14/2022



FINDINGS:



SUPPORT DEVICES: None.

HEART /PULMONARY VASCULATURE: No significant abnormality. 

LUNGS / PLEURA: Diminished lung volumes with retrocardiac airspace opacity. No sizable pleural effusi
on. No pneumothorax. 



ADDITIONAL FINDINGS: No significant additional findings.



IMPRESSION:

Suboptimal inspiration. Left basilar airspace opacity is suspicious for pneumonia.



Signer Name: Dandre Carrero MD 

Signed: 9/2/2022 10:56 AM

Workstation Name: [id number]

## 2022-09-02 NOTE — EMERGENCY DEPARTMENT REPORT
ED Abdominal Pain HPI





- General


Chief Complaint: Abdominal Pain


Stated Complaint: ABD PAIN


Time Seen by Provider: 09/02/22 10:23


Source: patient, EMS


Mode of arrival: Stretcher


Limitations: No Limitations





- History of Present Illness


Initial Comments: 





52 yo F with history of complicated abdominal surgery who present with abdominal

distention that worsen the last couple of weeks. Pt have had cholecystectomy 

with stents in her liver postsurgical. Pt have spent 4 months on the surgical 

floor at [facility] and was discharged home about 2 1/2 weeks ago with home 

health. No fever or chills reported but distention is getting worse. Pt also 

reports history of IBS and Guillain [place] Syndrome.  No other modifying or 

associated factors reported. 





- Related Data


                                Home Medications











 Medication  Instructions  Recorded  Confirmed  Last Taken


 


Magnesium Oxide 400 mg PO DAILY 02/15/22 02/15/22 02/13/22 09:00


 


Metoprolol Xl [Metoprolol 50 mg PO QDAY 02/15/22 02/15/22 02/13/22 09:00





SUCCINATE ER TAB]    


 


Nortriptyline HCl [Pamelor] 75 mg PO DAILY 02/15/22 02/15/22 02/13/22 09:00


 


Omeprazole 40 mg PO DAILY 02/15/22 02/15/22 02/13/22 09:00


 


Valsartan [Diovan] 40 mg PO DAILY 02/15/22 02/15/22 02/13/22 09:00


 


dilTIAZem HCl [Diltiazem 24Hr  mg PO QDAY 02/15/22 02/15/22 02/13/22 09:00





(Cd)]    








                                  Previous Rx's











 Medication  Instructions  Recorded  Last Taken  Type


 


Benzonatate [Tessalon Perles] 100 mg PO Q8HR #20 capsule 04/08/20 Unknown Rx


 


Amoxicillin/Potassium Clav 1 each PO BID 3 Days #6 tab 02/16/22 Unknown Rx





[Augmentin 875-125 Tablet]    


 


Azithromycin 500 mg PO DAILY 2 Days #2 tab 02/16/22 Unknown Rx











                                    Allergies











Allergy/AdvReac Type Severity Reaction Status Date / Time


 


codeine Allergy  Anaphylaxis Verified 09/02/22 10:11














ED Review of Systems


ROS: 


Stated complaint: ABD PAIN


Other details as noted in HPI





Comment: All other systems reviewed and negative


Gastrointestinal: abdominal pain, nausea, other (abdominal distention)





ED Past Medical Hx





- Past Medical History


Previous Medical History?: Yes


Hx Hypertension: Yes


Additional medical history: IBS, guillain barre syndrome, bedbound





- Surgical History


Additional Surgical History: hEPATIC STENT, GALLBLADDER REMOVAL





- Social History


Smoking Status: Current Every Day Smoker





- Medications


Home Medications: 


                                Home Medications











 Medication  Instructions  Recorded  Confirmed  Last Taken  Type


 


Benzonatate [Tessalon Perles] 100 mg PO Q8HR #20 capsule 04/08/20 02/14/22 

Unknown Rx


 


Magnesium Oxide 400 mg PO DAILY 02/15/22 02/15/22 02/13/22 09:00 History


 


Metoprolol Xl [Metoprolol 50 mg PO QDAY 02/15/22 02/15/22 02/13/22 09:00 History





SUCCINATE ER TAB]     


 


Nortriptyline HCl [Pamelor] 75 mg PO DAILY 02/15/22 02/15/22 02/13/22 09:00 

History


 


Omeprazole 40 mg PO DAILY 02/15/22 02/15/22 02/13/22 09:00 History


 


Valsartan [Diovan] 40 mg PO DAILY 02/15/22 02/15/22 02/13/22 09:00 History


 


dilTIAZem HCl [Diltiazem 24Hr  mg PO QDAY 02/15/22 02/15/22 02/13/22 09:00

 History





(Cd)]     


 


Amoxicillin/Potassium Clav 1 each PO BID 3 Days #6 tab 02/16/22  Unknown Rx





[Augmentin 875-125 Tablet]     


 


Azithromycin 500 mg PO DAILY 2 Days #2 tab 02/16/22  Unknown Rx














ED Physical Exam





- General


Limitations: No Limitations


General appearance: alert, in no apparent distress





- Head


Head exam: Present: atraumatic, normal inspection





- Eye


Eye exam: Present: normal appearance





- ENT


ENT exam: Present: normal exam, normal orophraynx, mucous membranes dry





- Neck


Neck exam: Present: normal inspection





- Respiratory


Respiratory exam: Present: normal lung sounds bilaterally.  Absent: respiratory 

distress, accessory muscle use





- Cardiovascular


Cardiovascular Exam: Present: normal rhythm, tachycardia, normal heart sounds





- GI/Abdominal


GI/Abdominal exam: Present: distended, tenderness (with mild diffuse tenderness 

), diminished bowel sounds





- Extremities Exam


Extremities exam: Present: pedal edema (+2 bilateral pitting edema )





- Back Exam


Back exam: Absent: tenderness





- Neurological Exam


Neurological exam: Present: alert, oriented X3





- Psychiatric


Psychiatric exam: Present: normal affect, normal mood





- Skin


Skin exam: Present: warm, normal color





ED Course


                                   Vital Signs











  09/02/22 09/02/22 09/02/22





  10:08 10:32 10:46


 


Temperature 97.7 F  


 


Pulse Rate 103 H 100 H 102 H


 


Respiratory 16 14 16





Rate   


 


Blood Pressure   104/67


 


Blood Pressure 92/62  





[Left]   


 


O2 Sat by Pulse 98 97 96





Oximetry   














  09/02/22 09/02/22 09/02/22





  11:46 12:30 14:00


 


Temperature   


 


Pulse Rate 105 H 103 H 109 H


 


Respiratory 15 14 17





Rate   


 


Blood Pressure 100/65 104/67 109/72


 


Blood Pressure   





[Left]   


 


O2 Sat by Pulse 98 95 96





Oximetry   














  09/02/22 09/02/22 09/02/22





  14:31 15:15 15:31


 


Temperature   


 


Pulse Rate 108 H 106 H 109 H


 


Respiratory 13 13 16





Rate   


 


Blood Pressure  94/62 98/71


 


Blood Pressure   





[Left]   


 


O2 Sat by Pulse 96 93 97





Oximetry   














  09/02/22 09/02/22 09/02/22





  16:15 16:19 16:30


 


Temperature   


 


Pulse Rate 106 H  108 H


 


Respiratory 14  14





Rate   


 


Blood Pressure 105/75  105/75


 


Blood Pressure   





[Left]   


 


O2 Sat by Pulse 95 98 96





Oximetry   














  09/02/22 09/02/22 09/02/22





  16:45 17:01 17:15


 


Temperature   


 


Pulse Rate 109 H 108 H 107 H


 


Respiratory 16 15 13





Rate   


 


Blood Pressure 111/81 111/81 111/81


 


Blood Pressure   





[Left]   


 


O2 Sat by Pulse 97 97 97





Oximetry   














ED Medical Decision Making





- Lab Data


Result diagrams: 


                                 09/03/22 04:28





                                 09/03/22 04:28





- Medical Decision Making





abdominal distention with recent abdominal surgery with extensive complication 

-- in patient with history of IBS and Guillian [place] Syndrome-- this distention 

could with ascites due to liver failure--so we will go ahead and get routine 

labs including CBC, CMP, and CT scan of the abdomen for further evaluation and 

appropriate treatment.





Lab reviewed and noted to have hyponatremia which seems to be chronic looking at

 previous visits and at baseline at 126 mg/dL, also noted with hyperkalemia of 

5.2 which seems to be chronic with this patient we will go ahead and treat with 

Kayexalate and insulin--while waiting for the above imaging.





CT abd/pel did not show any acute inflammatory changes but moderate ascities 

fluids--I called and spoke with Dr Smith who came to the ED and evaluate 

patient. We both agreed that this patient with no report of difficulty of 

breathing can be taken care of by her gastroenterologist/primary doctor.  No 

urgent paracentesis is needed at this time however after talking to the  

he mentioned that the wife is likely confused a little. So with her 

hyponatremia, confusion and ammonia pending, and hyperkalemia will make a case 

for admission to step down or progressive floor. Dr Smith agreed. Will get CT 

head, ABG and alcohol level pending at this time. 


Critical care attestation.: 


If time is entered above; I have spent that time in minutes in the direct care 

of this critically ill patient, excluding procedure time.








ED Disposition


Clinical Impression: 


 Abdominal distension, Hyperkalemia, Chronic hyponatremia





Abdominal ascites


Qualifiers:


 Ascites type: other type Qualified Code(s): R18.8 - Other ascites





Disposition: 09 ADMITTED AS INPATIENT


Is pt being admited?: Yes


Does the pt Need Aspirin: No


Condition: Serious

## 2022-09-02 NOTE — ELECTROCARDIOGRAPH REPORT
[facility]

                                       

Test Date:    2022               Test Time:    12:55:19

Pat Name:     CHANTAL MERINO        Department:   

Patient ID:   [id number]          Room:          

Gender:       F                        Technician:   KHALIDA

:          1970               Requested By: UMANG MANLEY

Order Number: [id number]             Reading MD:   Chad Carlos

                                 Measurements

Intervals                              Axis          

Rate:         106                      P:            42

CT:           175                      QRS:          42

QRSD:         69                       T:            -9

QT:           336                                    

QTc:          447                                    

                           Interpretive Statements

Sinus tachycardia

Borderline T abnormalities, diffuse leads

Compared to ECG 2022 17:29:14

No significant

Electronically Signed On 2022 13:44:46 EDT by Chad Carlos

## 2022-09-03 ENCOUNTER — OUT OF OFFICE VISIT (OUTPATIENT)
Dept: URBAN - METROPOLITAN AREA MEDICAL CENTER 41 | Facility: MEDICAL CENTER | Age: 52
End: 2022-09-03
Payer: COMMERCIAL

## 2022-09-03 DIAGNOSIS — R41.0 CONFUSION: ICD-10-CM

## 2022-09-03 DIAGNOSIS — R18.8 ABDOMINAL ASCITES: ICD-10-CM

## 2022-09-03 DIAGNOSIS — K74.69 CIRRHOSIS, CRYPTOGENIC: ICD-10-CM

## 2022-09-03 LAB
ALBUMIN SERPL-MCNC: 2.7 G/DL (ref 3.9–5)
ALT SERPL-CCNC: 38 UNITS/L (ref 7–56)
APTT BLD: 37.7 SEC. (ref 24.2–36.6)
BAND NEUTROPHILS # (MANUAL): 0 K/MM3
BUN SERPL-MCNC: 12 MG/DL (ref 7–17)
BUN/CREAT SERPL: 15 %
CALCIUM SERPL-MCNC: 8.5 MG/DL (ref 8.4–10.2)
CRP SERPL-MCNC: 6.6 MG/DL (ref 0–1.3)
HCT VFR BLD CALC: 34.1 % (ref 30.3–42.9)
HEMOLYSIS INDEX: 11
HGB BLD-MCNC: 11.6 GM/DL (ref 10.1–14.3)
INR PPP: 1.16 (ref 0.87–1.13)
MCHC RBC AUTO-ENTMCNC: 34 % (ref 30–34)
MCV RBC AUTO: 88 FL (ref 79–97)
MYELOCYTES # (MANUAL): 0 K/MM3
PLATELET # BLD: 245 K/MM3 (ref 140–440)
PROMYELOCYTES # (MANUAL): 0 K/MM3
RBC # BLD AUTO: 3.86 M/MM3 (ref 3.65–5.03)
TOTAL CELLS COUNTED BLD: 100

## 2022-09-03 PROCEDURE — 99232 SBSQ HOSP IP/OBS MODERATE 35: CPT | Performed by: INTERNAL MEDICINE

## 2022-09-03 PROCEDURE — 99222 1ST HOSP IP/OBS MODERATE 55: CPT | Performed by: INTERNAL MEDICINE

## 2022-09-03 PROCEDURE — G8427 DOCREV CUR MEDS BY ELIG CLIN: HCPCS | Performed by: INTERNAL MEDICINE

## 2022-09-03 RX ADMIN — Medication SCH MG: at 11:22

## 2022-09-03 RX ADMIN — PANTOPRAZOLE SODIUM SCH MG: 40 TABLET, DELAYED RELEASE ORAL at 11:21

## 2022-09-03 RX ADMIN — INSULIN LISPRO SCH UNIT: 100 INJECTION, SOLUTION INTRAVENOUS; SUBCUTANEOUS at 17:13

## 2022-09-03 RX ADMIN — MULTIVITAMIN TABLET SCH EACH: TABLET at 11:21

## 2022-09-03 RX ADMIN — METHYLPREDNISOLONE SODIUM SUCCINATE SCH MG: 40 INJECTION, POWDER, FOR SOLUTION INTRAMUSCULAR; INTRAVENOUS at 21:35

## 2022-09-03 RX ADMIN — HEPARIN SODIUM SCH UNIT: 5000 INJECTION, SOLUTION INTRAVENOUS; SUBCUTANEOUS at 21:35

## 2022-09-03 RX ADMIN — SPIRONOLACTONE SCH MG: 50 TABLET ORAL at 21:34

## 2022-09-03 RX ADMIN — Medication SCH UNIT: at 11:22

## 2022-09-03 RX ADMIN — Medication SCH ML: at 11:22

## 2022-09-03 RX ADMIN — OXYCODONE AND ACETAMINOPHEN PRN TAB: 5; 325 TABLET ORAL at 21:35

## 2022-09-03 RX ADMIN — Medication SCH MG: at 21:35

## 2022-09-03 RX ADMIN — FOLIC ACID SCH MG: 1 TABLET ORAL at 11:21

## 2022-09-03 RX ADMIN — NORTRIPTYLINE HYDROCHLORIDE SCH MG: 25 CAPSULE ORAL at 11:22

## 2022-09-03 RX ADMIN — INSULIN LISPRO SCH UNIT: 100 INJECTION, SOLUTION INTRAVENOUS; SUBCUTANEOUS at 11:25

## 2022-09-03 RX ADMIN — METHYLPREDNISOLONE SODIUM SUCCINATE SCH MG: 40 INJECTION, POWDER, FOR SOLUTION INTRAMUSCULAR; INTRAVENOUS at 14:31

## 2022-09-03 RX ADMIN — OXYCODONE HYDROCHLORIDE AND ACETAMINOPHEN SCH MG: 500 TABLET ORAL at 11:22

## 2022-09-03 RX ADMIN — Medication SCH MG: at 11:21

## 2022-09-03 RX ADMIN — HEPARIN SODIUM SCH UNIT: 5000 INJECTION, SOLUTION INTRAVENOUS; SUBCUTANEOUS at 11:21

## 2022-09-03 RX ADMIN — OXYCODONE HYDROCHLORIDE AND ACETAMINOPHEN SCH MG: 500 TABLET ORAL at 21:35

## 2022-09-03 RX ADMIN — CEFTRIAXONE SODIUM SCH MLS/HR: 2 INJECTION, POWDER, FOR SOLUTION INTRAMUSCULAR; INTRAVENOUS at 16:51

## 2022-09-03 RX ADMIN — LACTULOSE SCH GM: 20 SOLUTION ORAL at 11:21

## 2022-09-03 RX ADMIN — Medication SCH ML: at 21:36

## 2022-09-03 RX ADMIN — METHYLPREDNISOLONE SODIUM SUCCINATE SCH MG: 40 INJECTION, POWDER, FOR SOLUTION INTRAMUSCULAR; INTRAVENOUS at 05:59

## 2022-09-03 RX ADMIN — AZITHROMYCIN SCH MG: 250 TABLET, FILM COATED ORAL at 11:22

## 2022-09-03 NOTE — PROGRESS NOTE
<ROGELIO HORN - Last Filed: 09/03/22 17:27>





Assessment and Plan


Assessment and plan: 


This is a 51-year-old female with known past medical history of ETOH abuse, 

liver cirrhosis s/p biliary stent placement, HTN, nicotine dependence, Guillan 

[place] Syndrome, IBS, and Obesity Hypoventilation Syndrome admitted for liver cir

rhosis with ascites, acute hypoxic respiratory failure, and LLL pneumonia.





Hospital Course to Date:


9/3: Mentation improved, fully AAO this am. Desated in the 80s overnight, now 2L

NC, no respiratory distress noted. Patient remains afebrile and VSS, cultures 

pending, continue empiric IV Abx. Anasarca and +4 pitting edema noted, X1 dose 

of low dose IV Lasix ordered, patient is on IV steroids. Continue PO lactulose 

and PPI. GI consult pending. Patient also reported that she has been bedbound 

since April of this year. D-dimer is elevated, will also check BLE doppler to 

r/o DVT. Continue to trend LFTs. Get records from PCP and Avinash Godinez.











Assessment and Plan


#Acute Hypoxic Respiratory Failure


#LLL Pneumonia (CAP)


- Desated in the 80s overnight, now on 2L NC


- CXR suggesting left basilar pneumonia


- COVID PCR pending


- Empiric IV Abx initiated- Azithro, rocephin


- Patient is afebrile, VSS, and cultures pending


- Continue O2 supplementation and wean as tolerated


- Continue SPO2 monitoring for SPO2 goal above 92%


- F/U on cultures


- Daily CBC monitor





#Alcoholic Cirrhosis of Liver with Ascites


- Generalized anasarca and +4 pitting edema noted


- CTabd/pelvis reviewed


- X1 dose of IV lasix ordered


- On PO lactulose, IV steroids, and PPI


- GI consulted


- Check abdominal US to assess need for possible paracentesis


- Continue to trend LFTs


- of noted,patient endorse that she has not drink for over a year and a half





#Acute Hepatic Encephalopathy


#Hyperammonemia 


#H/o ETOH Abuse


- Presented with AMS, ammonia level mildly elevated at 61


- Most likely related to above


- CT head/brain with no acute intracranial abnormality


- On PO Lactulose, mentation improved, Fully AAO this am


- Patient reported she has not drink any alcohol for a years and a half


- On Thiamine, folic acid, multivitamin daily, and PRN CIWA protocol


- Avoid delirium


- PRN Analgesia for pain control


- Maintenance of sleep-wake cycle





#Hyponatremia


#Fluid Overload


- Gentle diurese, X1 dose of IV lasix


- Monitor and replace electrolytes as needed


- Trend BMP





#Hyperglycemia


- Per patient no previous history of DM, probably due to IV steroids


- BG check and SSI initiated Q6hrs


- Avoid hypoglycemia


- Check hgba1c





#Elevated D-Dimer


- COVID PCR pending


- Check BLE doppler to r/o DVT


- Heparin subQ





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


- Chronic, no active treatment at this time.  


- No clinical symptoms present at this time.  Continue supportive measures


- Outpatient neurology follow-up





#Nicotine Dependence


- Current daily 1 pack a day cigarettes smoker for over 20 years


- Smoking cessation education and quitline resources provided


- Patient denied any urges at this time, Nicotine patch as needed





#Obesity Hypoventilation Syndrome


- Balanced diet, increase physical activity discharge


- outpatient pulmonary follow-up for sleep study.  


- Noninvasive positive pressure ventilation as clinically indicated.





#GI/DVT Prophylaxis


- PPI- protonix


- Heparin SubQ





#Advance Care Planning 


- Disease education data, care plan, diagnoses, and prognosis were discussed 

with patient at the bedside. Patient is a FULL code.  Patient acknowledged 

understanding and agreed with current care plan.





 


The high probability of a clinically significant, sudden or life threatening 

deterioration of the [multiple] system(s) required my full and direct attention,

 intervention and personal management. The aggregate critical care time was [60]

 minutes. This time is in addition to time spent performing reported procedures 

but includes the following: 





[x] Data Review and interpretation 





[x] Patient assessment and monitoring of vital signs 





[x] Documentation 





[x] Medication orders and management





Disposition Plan: IMCU


Total Time Spent with Patient (Minutes): 60





History


Interval history: 


Patient seen and examined at the bedside. Fully AAO. Patient desated in the 80s 

overnight, now stable on 2L NC, denied any pain nor discomfort at this time. 

Anasarca and 4+ pitting BLE edema noted, VSS. REINALDO overnight.





Hospitalist Physical





- Constitutional


Vitals: 


                                        











Temp Pulse Resp BP Pulse Ox


 


 98.1 F   115 H  13   129/87   96 


 


 09/03/22 03:18  09/03/22 06:00  09/03/22 06:00  09/03/22 06:00  09/03/22 06:00











General appearance: Present: no acute distress, well-nourished, obese





- EENT


Eyes: Present: PERRL, EOM intact


ENT: hearing intact





- Neck


Neck: Present: normal ROM





- Respiratory


Respiratory effort: normal


Respiratory: bilateral: diminished





- Cardiovascular


Rhythm: regular


Heart Sounds: Present: S1 & S2





- Extremities


Extremities: no ischemia, pulses intact, pulses symmetrical


Extremity abnormal: edema





- Peripheral Assessment


  ** Bilateral Lower Extremity


Edema Type: Pitting


Edema Degree: 4+


Capillary Refill: < 3 seconds


Skin Temperature: Warm





  ** Generalized


Edema Type: Pitting


Edema Degree: 2+


Capillary Refill: < 3 seconds


Skin Temperature: Warm


Peripheral Pulses: within normal limits





- Abdominal


General gastrointestinal: soft, distended, normal bowel sounds, other (Anasarca)





- Integumentary


Integumentary: Present: warm, dry, erythema





- Psychiatric


Psychiatric: appropriate mood/affect, cooperative





- Neurologic


Neurologic: CNII-XII intact, moves all extremities (Generalized weakness)





- Allied Health


Allied health notes reviewed: nursing





HEART Score





- HEART Score


Troponin: 


                                        











Troponin T  < 0.010 ng/mL (0.00-0.029)   09/02/22  Unknown














Results





- Labs


CBC & Chem 7: 


                                 09/03/22 04:28





                                 09/03/22 04:28


Labs: 


                             Laboratory Last Values











WBC  9.0 K/mm3 (4.5-11.0)   09/03/22  04:28    


 


RBC  3.86 M/mm3 (3.65-5.03)   09/03/22  04:28    


 


Hgb  11.6 gm/dl (10.1-14.3)   09/03/22  04:28    


 


Hct  34.1 % (30.3-42.9)   09/03/22  04:28    


 


MCV  88 fl (79-97)   09/03/22  04:28    


 


MCH  30 pg (28-32)   09/03/22  04:28    


 


MCHC  34 % (30-34)   09/03/22  04:28    


 


RDW  15.3 % (13.2-15.2)  H  09/03/22  04:28    


 


Plt Count  245 K/mm3 (140-440)   09/03/22  04:28    


 


Lymph % (Auto)  25.3 % (13.4-35.0)   09/02/22  Unknown


 


Mono % (Auto)  9.9 % (0.0-7.3)  H  09/02/22  Unknown


 


Eos % (Auto)  1.2 % (0.0-4.3)   09/02/22  Unknown


 


Baso % (Auto)  0.6 % (0.0-1.8)   09/02/22  Unknown


 


Lymph # (Auto)  2.6 K/mm3 (1.2-5.4)   09/02/22  Unknown


 


Mono # (Auto)  1.0 K/mm3 (0.0-0.8)  H  09/02/22  Unknown


 


Eos # (Auto)  0.1 K/mm3 (0.0-0.4)   09/02/22  Unknown


 


Baso # (Auto)  0.1 K/mm3 (0.0-0.1)   09/02/22  Unknown


 


Add Manual Diff  Complete   09/03/22  04:28    


 


Total Counted  100   09/03/22  04:28    


 


Seg Neutrophils %  Np   09/03/22  04:28    


 


Seg Neuts % (Manual)  87.0 % (40.0-70.0)  H  09/03/22  04:28    


 


Band Neutrophils %  0 %  09/03/22  04:28    


 


Lymphocytes % (Manual)  9.0 % (13.4-35.0)  L  09/03/22  04:28    


 


Reactive Lymphs % (Man)  0 %  09/03/22  04:28    


 


Monocytes % (Manual)  4.0 % (0.0-7.3)   09/03/22  04:28    


 


Eosinophils % (Manual)  0 % (0.0-4.3)   09/03/22  04:28    


 


Basophils % (Manual)  0 % (0.0-1.8)   09/03/22  04:28    


 


Metamyelocytes %  0 %  09/03/22  04:28    


 


Myelocytes %  0 %  09/03/22  04:28    


 


Promyelocytes %  0 %  09/03/22  04:28    


 


Blast Cells %  0 %  09/03/22  04:28    


 


Nucleated RBC %  Not Reportable   09/03/22  04:28    


 


Seg Neutrophils #  6.4 K/mm3 (1.8-7.7)   09/02/22  Unknown


 


Seg Neutrophils # Man  7.8 K/mm3 (1.8-7.7)  H  09/03/22  04:28    


 


Band Neutrophils #  0.0 K/mm3  09/03/22  04:28    


 


Lymphocytes # (Manual)  0.8 K/mm3 (1.2-5.4)  L  09/03/22  04:28    


 


Abs React Lymphs (Man)  0.0 K/mm3  09/03/22  04:28    


 


Monocytes # (Manual)  0.4 K/mm3 (0.0-0.8)   09/03/22  04:28    


 


Eosinophils # (Manual)  0.0 K/mm3 (0.0-0.4)   09/03/22  04:28    


 


Basophils # (Manual)  0.0 K/mm3 (0.0-0.1)   09/03/22  04:28    


 


Metamyelocytes #  0.0 K/mm3  09/03/22  04:28    


 


Myelocytes #  0.0 K/mm3  09/03/22  04:28    


 


Promyelocytes #  0.0 K/mm3  09/03/22  04:28    


 


Blast Cells #  0.0 K/mm3  09/03/22  04:28    


 


WBC Morphology  Not Reportable   09/03/22  04:28    


 


Hypersegmented Neuts  Not Reportable   09/03/22  04:28    


 


Hyposegmented Neuts  Not Reportable   09/03/22  04:28    


 


Hypogranular Neuts  Not Reportable   09/03/22  04:28    


 


Smudge Cells  Not Reportable   09/03/22  04:28    


 


Toxic Granulation  Not Reportable   09/03/22  04:28    


 


Toxic Vacuolation  Not Reportable   09/03/22  04:28    


 


Dohle Bodies  Not Reportable   09/03/22  04:28    


 


Pelger-Huet Anomaly  Not Reportable   09/03/22  04:28    


 


Brown Rods  Not Reportable   09/03/22  04:28    


 


Platelet Estimate  Not Reportable   09/03/22  04:28    


 


Clumped Platelets  Not Reportable   09/03/22  04:28    


 


Plt Clumps, EDTA  Not Reportable   09/03/22  04:28    


 


Large Platelets  Not Reportable   09/03/22  04:28    


 


Giant Platelets  Not Reportable   09/03/22  04:28    


 


Platelet Satelliting  Not Reportable   09/03/22  04:28    


 


Plt Morphology Comment  Not Reportable   09/03/22  04:28    


 


RBC Morphology  Normal   09/03/22  04:28    


 


Dimorphic RBCs  Not Reportable   09/03/22  04:28    


 


Polychromasia  Not Reportable   09/03/22  04:28    


 


Hypochromasia  Not Reportable   09/03/22  04:28    


 


Poikilocytosis  Not Reportable   09/03/22  04:28    


 


Anisocytosis  Not Reportable   09/03/22  04:28    


 


Microcytosis  Not Reportable   09/03/22  04:28    


 


Macrocytosis  Not Reportable   09/03/22  04:28    


 


Spherocytes  Not Reportable   09/03/22  04:28    


 


Pappenheimer Bodies  Not Reportable   09/03/22  04:28    


 


Sickle Cells  Not Reportable   09/03/22  04:28    


 


Target Cells  Not Reportable   09/03/22  04:28    


 


Tear Drop Cells  Not Reportable   09/03/22  04:28    


 


Ovalocytes  Not Reportable   09/03/22  04:28    


 


Helmet Cells  Not Reportable   09/03/22  04:28    


 


[place] Bodies  Not Reportable   09/03/22  04:28    


 


Cabot Rings  Not Reportable   09/03/22  04:28    


 


[place] Cells  Not Reportable   09/03/22  04:28    


 


Bite Cells  Not Reportable   09/03/22  04:28    


 


Crenated Cell  Not Reportable   09/03/22  04:28    


 


Elliptocytes  Not Reportable   09/03/22  04:28    


 


Acanthocytes (Spur)  Not Reportable   09/03/22  04:28    


 


Rouleaux  Not Reportable   09/03/22  04:28    


 


Hemoglobin C Crystals  Not Reportable   09/03/22  04:28    


 


Schistocytes  Not Reportable   09/03/22  04:28    


 


Malaria parasites  Not Reportable   09/03/22  04:28    


 


Bonifacio Bodies  Not Reportable   09/03/22  04:28    


 


Hem Pathologist Commnt  No   09/03/22  04:28    


 


PT  16.2 Sec. (12.2-14.9)  H  09/03/22  09:50    


 


INR  1.16  (0.87-1.13)  H  09/03/22  09:50    


 


D-Dimer  2651.42 ng/mlDDU (0-234)  H  09/02/22  22:33    


 


Sodium  130 mmol/L (137-145)  L  09/03/22  04:28    


 


Potassium  4.4 mmol/L (3.6-5.0)   09/03/22  04:28    


 


Chloride  96.0 mmol/L ()  L  09/03/22  04:28    


 


Carbon Dioxide  19 mmol/L (22-30)  L  09/03/22  04:28    


 


Anion Gap  19 mmol/L  09/03/22  04:28    


 


BUN  12 mg/dL (7-17)   09/03/22  04:28    


 


Creatinine  0.8 mg/dL (0.6-1.2)   09/03/22  04:28    


 


Estimated GFR  > 60 ml/min  09/03/22  04:28    


 


BUN/Creatinine Ratio  15 %  09/03/22  04:28    


 


Glucose  150 mg/dL ()  H  09/03/22  04:28    


 


Calcium  8.5 mg/dL (8.4-10.2)   09/03/22  04:28    


 


Total Bilirubin  1.00 mg/dL (0.1-1.2)   09/03/22  04:28    


 


AST  108 units/L (5-40)  H  09/03/22  04:28    


 


ALT  38 units/L (7-56)   09/03/22  04:28    


 


Alkaline Phosphatase  306 units/L ()  H  09/03/22  04:28    


 


Ammonia  33.0 umol/L (25-60)   09/03/22  09:50    


 


Lactate Dehydrogenase  144 units/L ()   09/02/22  22:33    


 


Troponin T  < 0.010 ng/mL (0.00-0.029)   09/02/22  Unknown


 


C-Reactive Protein  6.60 mg/dL (0.00-1.30)  H  09/02/22  22:33    


 


Total Protein  5.9 g/dL (6.3-8.2)  L  09/03/22  04:28    


 


Albumin  2.7 g/dL (3.9-5)  L  09/03/22  04:28    


 


Albumin/Globulin Ratio  0.8 %  09/03/22  04:28    


 


Plasma/Serum Alcohol  < 0.01 % (0-0.07)   09/02/22  15:56    











Microbiology: 


Microbiology





09/02/22 22:33   Peripheral/Venous   Blood Culture - Preliminary


                            Culture in Progress


09/02/22 22:44   Peripheral/Venous   Blood Culture - Preliminary


                            Culture in Progress








Hurt/IV: 


                                        





Voiding Method                   External Female Catheter











Active Medications





- Current Medications


Current Medications: 














Generic Name Dose Route Start Last Admin





  Trade Name Freq  PRN Reason Stop Dose Admin


 


Ascorbic Acid  500 mg  09/02/22 22:00  09/02/22 21:06





  Ascorbic Acid 500 Mg Tab  PO   Not Given





  BID NICOLE  


 


Azithromycin  500 mg  09/03/22 10:00 





  Azithromycin 250 Mg Tab  PO  





  QDAY [facility]  





  Protocol  


 


Cholecalciferol  1,000 unit  09/03/22 10:00 





  Cholecalciferol (Vit D3) 1000 Unit (25 Mcg) Tab  PO  





  QDAY [facility]  


 


Dextrose  50 ml  09/03/22 09:09 





  Dextrose 50% In Water (25gm) 50 Ml Syringe  IV  





  Q30MIN PRN  





  Hypoglycemia  





  Protocol  


 


Folic Acid  1 mg  09/03/22 10:00 





  Folic Acid 1 Mg Tab  PO  





  QDAY [facility]  


 


Heparin Sodium (Porcine)  5,000 unit  09/02/22 22:00  09/02/22 21:06





  Heparin 5,000 Unit/1 Ml Vial  SUB-Q   5,000 unit





  Q12HR [facility]   Administration


 


Hydromorphone HCl  0.5 mg  09/02/22 16:00 





  Hydromorphone 0.5 Mg/0.5 Ml Inj  IV  





  Q23H PRN  





  Pain , Severe (7-10)  


 


Ceftriaxone Sodium  2 gm in 100 mls @ 200 mls/hr  09/02/22 16:00  09/02/22 17:23





  Rocephin/Ns 2 Gm/100 Ml  IV   200 mls/hr





  Q24H NICOLE   Administration





  Protocol  


 


Ibuprofen  600 mg  09/02/22 16:00 





  Ibuprofen 600 Mg Tab  PO  





  Q6H PRN  





  Pain, Mild (1-3)  


 


Insulin Human Lispro  0 unit  09/03/22 12:00 





  Insulin Lispro 100 Unit/Ml  SUB-Q  





  Q6HR [facility]  





  Protocol  


 


Lactulose  20 gm  09/03/22 10:00 





  Lactulose 20 Gm/30 Ml Oral Liqd  PO  





  QDAY [facility]  


 


Lorazepam  2 mg  09/02/22 16:02 





  Lorazepam 2 Mg/Ml Vial  IV  





  Q1HR PRN  





  CIWA-Ar 8-15  


 


Lorazepam  4 mg  09/02/22 16:02 





  Lorazepam 2 Mg/Ml Vial  IV  





  Q1HR PRN  





  CIWA-Ar 16-25  


 


Magnesium Oxide  400 mg  09/03/22 10:00 





  Magnesium Oxide 400 Mg Tab  PO  





  DAILY [facility]  


 


Methylprednisolone Sodium Succinate  40 mg  09/02/22 22:00  09/03/22 05:59





  Methylprednisolone Sod Succinate 40 Mg/1 Ml Inj  IV   40 mg





  Q8HR [facility]   Administration


 


Multivitamins  1 each  09/03/22 10:00 





  Multivitamins ,Therapeutic Tab  PO  





  QDAY [facility]  


 


Nortriptyline HCl  75 mg  09/03/22 10:00 





  Nortriptyline 25 Mg Cap  PO  





  QDAY NICOLE  


 


Oxycodone/Acetaminophen  1 tab  09/02/22 16:00 





  Oxycodone /Acetaminophen 5-325mg Tab  PO  





  Q16H PRN  





  Pain, Moderate (4-6)  


 


Pantoprazole Sodium  40 mg  09/03/22 10:00 





  Pantoprazole 40 Mg Tab  PO  





  DAILY NICOLE  


 


Sodium Chloride  10 ml  09/02/22 22:00  09/02/22 21:06





  Sodium Chloride 0.9% 10 Ml Flush Syringe  IV   10 ml





  BID NICOLE   Administration


 


Sodium Chloride  10 ml  09/02/22 15:34 





  Sodium Chloride 0.9% 10 Ml Flush Syringe  IV  09/07/22 15:33 





  PRN PRN  





  LINE FLUSH  


 


Thiamine HCl  100 mg  09/03/22 10:00 





  Thiamine 100 Mg Tab  PO  





  QDAY NICOLE  


 


Zinc Sulfate  220 mg  09/02/22 22:00  09/02/22 21:06





  Zinc Sulfate 220 Mg Cap  PO   Not Given





  BID NICOLE  














Nutrition/Malnutrition Assess





- Dietary Evaluation


Nutrition/Malnutrition Findings: 


                                        





Nutrition Notes                                            Start:  09/03/22 

10:09


Freq:                                                      Status: Active       

 


Protocol:                                                                       

 


 Document     09/03/22 10:09  JENISE  (Rec: 09/03/22 10:35  JENISE  [id number])


 Nutrition Notes


     Need for Assessment generated from:         MD Order


     Initial or Follow up                        Assessment


     Current Diagnosis                           Hypertension


     Other Pertinent Diagnosis                   EtOH Dependence/Cirrhosis/


                                                 Ascites, Hepatic


                                                 Encephalopathy, COVID-19 pui..


                                                 .


     Current Diet                                NPO (since 09/02 15:36).


     Labs/Tests                                  09/03: Na 130, CL 96.0, CO2 19


                                                 , Glu 150.


     Pertinent Medications                       09/03: Vit C, Vit D3, Folic


                                                 acid, Multivitamins, Thiamine,


                                                 ZnSO4, others nutritionally


                                                 unremarkable.


     Height                                      5 ft 5 in


     Weight                                      117.9 kg


     Ideal Body Weight (kg)                      56.81


     BMI                                         43.2


     Intake Prior to Admission                   Good


     Weight change and time frame                Pt denies having loss body


                                                 weight PTA.


     Weight Status                               Morbidly Obese


     Subjective/Other Information                RD consult for Malnutrition


                                                 assessment.


                                                 Pt currently on NPO.


                                                 Pt is on Nasal Cannula. O2


                                                 saturation @ 96%, according to


                                                 Physical Assessment History


                                                 notes.


                                                 Pt presents Bilateral-LE Non-


                                                 Pitting Edema 2+, according to


                                                 Physical Assessment History


                                                 notes.


                                                 Pt complains about Abdominal


                                                 Distention increasing over the


                                                 last 2 weeks, according to


                                                 History & Physical notes.


                                                 Pt shows an unspecified


                                                 redness as sign of concern for


                                                 skin risk at the time,


                                                 according to Physical


                                                 Assessment History notes.


                                                 Pt shows no signs of concern


                                                 for risk of malnutrition at


                                                 the time, according to


                                                 Physical Assessment History


                                                 notes.


     Percent of energy/protein needs met:        Pt currently on NPO.


     Burn                                        Absent


     Trauma                                      Absent


     GI Symptoms                                 Other


     Food Allergy                                No


     Skin Integrity/Comment                      Unspecified redness.


     Current % PO                                Other


     Minimum of two criteria                     No


     Fluid Accumulation                          Mild (non-severe)


     Reduced  Strength                       N/A (non-severe)


     Protein-Calorie Malnutrition                N\A


     #1


      Nutrition Diagnosis                        Overweight/obesity


      Etiology                                   Possibly secondary to


                                                 Lifestyle.


      As Evidenced by Signs and Symptoms         BMI: 43.2 Kg/m2.


     Is patient on ventilator?                   No


     Is Patient Ambulatory and/or Out of Bed     No


     REE-(Pinch-St. Jeor-confined to bed)      2169.144


     Kcal/Kg value to use for calculation        13


     Approximate Energy Requirements Using       1533


      kcal/Kg                                    


     Calculation Used for Recommendations        Kcal/kg


     Additional Notes                            Protein: 0.6-0.8 g/Kg AdjBW;


                                                 53-70 g/day.


                                                 Fluids: 1 ml/Kcal, or as per


                                                 MD.


 Nutrition Intervention


     Change Diet Order:                          When pertinent, advance to


                                                 Cardiac Diet as tolerated.


     Goal #1                                     Adjust the dietary


                                                 intervention to better serve


                                                 Pt's needs and clinical


                                                 conditions during LOS.


     Follow-Up By:                               09/05/22


     Additional Comments                         When pertinent, start


                                                 monitoring food tolerance, %PO


                                                 intake of meals, and BM.











<JOSE ROBERTO KAM - Last Filed: 09/04/22 07:37>





Assessment and Plan


Assessment and plan: 


I saw and evaluated the patient. I agree with the findings and the plan of care 

as documented in the Nurse Practitioner's~note, with the following corrections 

and additions.











Hospitalist Physical





- Constitutional


Vitals: 


                                        











Temp Pulse Resp BP Pulse Ox


 


 97.5 F L  97 H  9 L  134/95   97 


 


 09/04/22 07:17  09/04/22 04:00  09/04/22 04:00  09/04/22 04:00  09/04/22 04:00














HEART Score





- HEART Score


Troponin: 


                                        











Troponin T  < 0.010 ng/mL (0.00-0.029)   09/02/22  Unknown














Results





- Labs


CBC & Chem 7: 


                                 09/03/22 04:28





                                 09/04/22 05:10


Labs: 


                             Laboratory Last Values











WBC  9.0 K/mm3 (4.5-11.0)   09/03/22  04:28    


 


RBC  3.86 M/mm3 (3.65-5.03)   09/03/22  04:28    


 


Hgb  11.6 gm/dl (10.1-14.3)   09/03/22  04:28    


 


Hct  34.1 % (30.3-42.9)   09/03/22  04:28    


 


MCV  88 fl (79-97)   09/03/22  04:28    


 


MCH  30 pg (28-32)   09/03/22  04:28    


 


MCHC  34 % (30-34)   09/03/22  04:28    


 


RDW  15.3 % (13.2-15.2)  H  09/03/22  04:28    


 


Plt Count  245 K/mm3 (140-440)   09/03/22  04:28    


 


Lymph % (Auto)  25.3 % (13.4-35.0)   09/02/22  Unknown


 


Mono % (Auto)  9.9 % (0.0-7.3)  H  09/02/22  Unknown


 


Eos % (Auto)  1.2 % (0.0-4.3)   09/02/22  Unknown


 


Baso % (Auto)  0.6 % (0.0-1.8)   09/02/22  Unknown


 


Lymph # (Auto)  2.6 K/mm3 (1.2-5.4)   09/02/22  Unknown


 


Mono # (Auto)  1.0 K/mm3 (0.0-0.8)  H  09/02/22  Unknown


 


Eos # (Auto)  0.1 K/mm3 (0.0-0.4)   09/02/22  Unknown


 


Baso # (Auto)  0.1 K/mm3 (0.0-0.1)   09/02/22  Unknown


 


Add Manual Diff  Complete   09/03/22  04:28    


 


Total Counted  100   09/03/22  04:28    


 


Seg Neutrophils %  Np   09/03/22  04:28    


 


Seg Neuts % (Manual)  87.0 % (40.0-70.0)  H  09/03/22  04:28    


 


Band Neutrophils %  0 %  09/03/22  04:28    


 


Lymphocytes % (Manual)  9.0 % (13.4-35.0)  L  09/03/22  04:28    


 


Reactive Lymphs % (Man)  0 %  09/03/22  04:28    


 


Monocytes % (Manual)  4.0 % (0.0-7.3)   09/03/22  04:28    


 


Eosinophils % (Manual)  0 % (0.0-4.3)   09/03/22  04:28    


 


Basophils % (Manual)  0 % (0.0-1.8)   09/03/22  04:28    


 


Metamyelocytes %  0 %  09/03/22  04:28    


 


Myelocytes %  0 %  09/03/22  04:28    


 


Promyelocytes %  0 %  09/03/22  04:28    


 


Blast Cells %  0 %  09/03/22  04:28    


 


Nucleated RBC %  Not Reportable   09/03/22  04:28    


 


Seg Neutrophils #  6.4 K/mm3 (1.8-7.7)   09/02/22  Unknown


 


Seg Neutrophils # Man  7.8 K/mm3 (1.8-7.7)  H  09/03/22  04:28    


 


Band Neutrophils #  0.0 K/mm3  09/03/22  04:28    


 


Lymphocytes # (Manual)  0.8 K/mm3 (1.2-5.4)  L  09/03/22  04:28    


 


Abs React Lymphs (Man)  0.0 K/mm3  09/03/22  04:28    


 


Monocytes # (Manual)  0.4 K/mm3 (0.0-0.8)   09/03/22  04:28    


 


Eosinophils # (Manual)  0.0 K/mm3 (0.0-0.4)   09/03/22  04:28    


 


Basophils # (Manual)  0.0 K/mm3 (0.0-0.1)   09/03/22  04:28    


 


Metamyelocytes #  0.0 K/mm3  09/03/22  04:28    


 


Myelocytes #  0.0 K/mm3  09/03/22  04:28    


 


Promyelocytes #  0.0 K/mm3  09/03/22  04:28    


 


Blast Cells #  0.0 K/mm3  09/03/22  04:28    


 


WBC Morphology  Not Reportable   09/03/22  04:28    


 


Hypersegmented Neuts  Not Reportable   09/03/22  04:28    


 


Hyposegmented Neuts  Not Reportable   09/03/22  04:28    


 


Hypogranular Neuts  Not Reportable   09/03/22  04:28    


 


Smudge Cells  Not Reportable   09/03/22  04:28    


 


Toxic Granulation  Not Reportable   09/03/22  04:28    


 


Toxic Vacuolation  Not Reportable   09/03/22  04:28    


 


Dohle Bodies  Not Reportable   09/03/22  04:28    


 


Pelger-Huet Anomaly  Not Reportable   09/03/22  04:28    


 


Brown Rods  Not Reportable   09/03/22  04:28    


 


Platelet Estimate  Not Reportable   09/03/22  04:28    


 


Clumped Platelets  Not Reportable   09/03/22  04:28    


 


Plt Clumps, EDTA  Not Reportable   09/03/22  04:28    


 


Large Platelets  Not Reportable   09/03/22  04:28    


 


Giant Platelets  Not Reportable   09/03/22  04:28    


 


Platelet Satelliting  Not Reportable   09/03/22  04:28    


 


Plt Morphology Comment  Not Reportable   09/03/22  04:28    


 


RBC Morphology  Normal   09/03/22  04:28    


 


Dimorphic RBCs  Not Reportable   09/03/22  04:28    


 


Polychromasia  Not Reportable   09/03/22  04:28    


 


Hypochromasia  Not Reportable   09/03/22  04:28    


 


Poikilocytosis  Not Reportable   09/03/22  04:28    


 


Anisocytosis  Not Reportable   09/03/22  04:28    


 


Microcytosis  Not Reportable   09/03/22  04:28    


 


Macrocytosis  Not Reportable   09/03/22  04:28    


 


Spherocytes  Not Reportable   09/03/22  04:28    


 


Pappenheimer Bodies  Not Reportable   09/03/22  04:28    


 


Sickle Cells  Not Reportable   09/03/22  04:28    


 


Target Cells  Not Reportable   09/03/22  04:28    


 


Tear Drop Cells  Not Reportable   09/03/22  04:28    


 


Ovalocytes  Not Reportable   09/03/22  04:28    


 


Helmet Cells  Not Reportable   09/03/22  04:28    


 


[place] Bodies  Not Reportable   09/03/22  04:28    


 


Cabot Rings  Not Reportable   09/03/22  04:28    


 


Demetra Cells  Not Reportable   09/03/22  04:28    


 


Bite Cells  Not Reportable   09/03/22  04:28    


 


Crenated Cell  Not Reportable   09/03/22  04:28    


 


Elliptocytes  Not Reportable   09/03/22  04:28    


 


Acanthocytes (Spur)  Not Reportable   09/03/22  04:28    


 


Rouleaux  Not Reportable   09/03/22  04:28    


 


Hemoglobin C Crystals  Not Reportable   09/03/22  04:28    


 


Schistocytes  Not Reportable   09/03/22  04:28    


 


Malaria parasites  Not Reportable   09/03/22  04:28    


 


Bonifacio Bodies  Not Reportable   09/03/22  04:28    


 


Hem Pathologist Commnt  No   09/03/22  04:28    


 


PT  16.2 Sec. (12.2-14.9)  H  09/03/22  09:50    


 


INR  1.16  (0.87-1.13)  H  09/03/22  09:50    


 


APTT  37.7 Sec. (24.2-36.6)  H  09/03/22  09:50    


 


D-Dimer  2651.42 ng/mlDDU (0-234)  H  09/02/22  22:33    


 


Sodium  129 mmol/L (137-145)  L  09/04/22  05:10    


 


Potassium  4.2 mmol/L (3.6-5.0)   09/04/22  05:10    


 


Chloride  93.8 mmol/L ()  L  09/04/22  05:10    


 


Carbon Dioxide  24 mmol/L (22-30)   09/04/22  05:10    


 


Anion Gap  15 mmol/L  09/04/22  05:10    


 


BUN  14 mg/dL (7-17)   09/04/22  05:10    


 


Creatinine  0.8 mg/dL (0.6-1.2)   09/04/22  05:10    


 


Estimated GFR  > 60 ml/min  09/04/22  05:10    


 


BUN/Creatinine Ratio  18 %  09/04/22  05:10    


 


Glucose  145 mg/dL ()  H  09/04/22  05:10    


 


POC Glucose  186 mg/dL ()  H  09/03/22  17:11    


 


Calcium  8.4 mg/dL (8.4-10.2)   09/04/22  05:10    


 


Phosphorus  4.40 mg/dL (2.5-4.5)   09/04/22  05:10    


 


Magnesium  1.80 mg/dL (1.7-2.3)   09/04/22  05:10    


 


Total Bilirubin  0.70 mg/dL (0.1-1.2)   09/04/22  05:10    


 


AST  67 units/L (5-40)  H  09/04/22  05:10    


 


ALT  35 units/L (7-56)   09/04/22  05:10    


 


Alkaline Phosphatase  290 units/L ()  H  09/04/22  05:10    


 


Ammonia  33.0 umol/L (25-60)   09/03/22  09:50    


 


Lactate Dehydrogenase  144 units/L ()   09/02/22  22:33    


 


Troponin T  < 0.010 ng/mL (0.00-0.029)   09/02/22  Unknown


 


C-Reactive Protein  6.60 mg/dL (0.00-1.30)  H  09/02/22  22:33    


 


Total Protein  6.3 g/dL (6.3-8.2)   09/04/22  05:10    


 


Albumin  2.8 g/dL (3.9-5)  L  09/04/22  05:10    


 


Albumin/Globulin Ratio  0.8 %  09/04/22  05:10    


 


Procalcitonin  0.37 ng/mL (<0.15)   09/02/22  22:33    


 


Plasma/Serum Alcohol  < 0.01 % (0-0.07)   09/02/22  15:56    


 


Coronavirus (PCR)  Negative  (Negative)   09/02/22  17:50    











Microbiology: 


Microbiology





09/02/22 22:33   Peripheral/Venous   Blood Culture - Preliminary


                            NO GROWTH AFTER 24 HOURS


09/02/22 22:44   Peripheral/Venous   Blood Culture - Preliminary


                            NO GROWTH AFTER 24 HOURS








Hurt/IV: 


                                        





Voiding Method                   External Female Catheter











Active Medications





- Current Medications


Current Medications: 














Generic Name Dose Route Start Last Admin





  Trade Name Freq  PRN Reason Stop Dose Admin


 


Ascorbic Acid  500 mg  09/02/22 22:00  09/03/22 21:35





  Ascorbic Acid 500 Mg Tab  PO   500 mg





  BID NICOLE   Administration


 


Azithromycin  500 mg  09/03/22 10:00  09/03/22 11:22





  Azithromycin 250 Mg Tab  PO   500 mg





  QDAY NICOLE   Administration





  Protocol  


 


Cholecalciferol  1,000 unit  09/03/22 10:00  09/03/22 11:22





  Cholecalciferol (Vit D3) 1000 Unit (25 Mcg) Tab  PO   1,000 unit





  QDAY NICOLE   Administration


 


Dextrose  50 ml  09/03/22 09:09 





  Dextrose 50% In Water (25gm) 50 Ml Syringe  IV  





  Q30MIN PRN  





  Hypoglycemia  





  Protocol  


 


Folic Acid  1 mg  09/03/22 10:00  09/03/22 11:21





  Folic Acid 1 Mg Tab  PO   1 mg





  QDAY NICOLE   Administration


 


Furosemide  20 mg  09/03/22 17:00  09/03/22 16:50





  Furosemide 40 Mg/4 Ml Inj  IV   20 mg





  ONCE NICOLE   Administration


 


Furosemide  20 mg  09/04/22 06:00 





  Furosemide 20 Mg/2 Ml Inj  IV  





  ONCE NICOLE  


 


Heparin Sodium (Porcine)  5,000 unit  09/02/22 22:00  09/03/22 21:35





  Heparin 5,000 Unit/1 Ml Vial  SUB-Q   5,000 unit





  Q12HR NICOLE   Administration


 


Hydromorphone HCl  0.5 mg  09/02/22 16:00 





  Hydromorphone 0.5 Mg/0.5 Ml Inj  IV  





  Q23H PRN  





  Pain , Severe (7-10)  


 


Ceftriaxone Sodium  2 gm in 100 mls @ 200 mls/hr  09/02/22 16:00  09/03/22 16:51





  Rocephin/Ns 2 Gm/100 Ml  IV   200 mls/hr





  Q24H NICOLE   Administration





  Protocol  


 


Insulin Human Lispro  0 unit  09/03/22 12:00  09/04/22 06:45





  Insulin Lispro 100 Unit/Ml  SUB-Q   Not Given





  Q6HR [facility]  





  Protocol  


 


Lactulose  20 gm  09/03/22 10:00  09/03/22 11:21





  Lactulose 20 Gm/30 Ml Oral Liqd  PO   20 gm





  QDAY NICOLE   Administration


 


Lorazepam  2 mg  09/02/22 16:02 





  Lorazepam 2 Mg/Ml Vial  IV  





  Q1HR PRN  





  CIWA-Ar 8-15  


 


Lorazepam  4 mg  09/02/22 16:02 





  Lorazepam 2 Mg/Ml Vial  IV  





  Q1HR PRN  





  CIWA-Ar 16-25  


 


Magnesium Oxide  400 mg  09/03/22 10:00  09/03/22 11:21





  Magnesium Oxide 400 Mg Tab  PO   400 mg





  DAILY NICOLE   Administration


 


Methylprednisolone Sodium Succinate  40 mg  09/02/22 22:00  09/04/22 06:45





  Methylprednisolone Sod Succinate 40 Mg/1 Ml Inj  IV   40 mg





  Q8HR NICOLE   Administration


 


Multivitamins  1 each  09/03/22 10:00  09/03/22 11:21





  Multivitamins ,Therapeutic Tab  PO   1 each





  QDAY NICOLE   Administration


 


Nortriptyline HCl  75 mg  09/03/22 10:00  09/03/22 11:22





  Nortriptyline 25 Mg Cap  PO   75 mg





  QDAY NICOLE   Administration


 


Oxycodone/Acetaminophen  1 tab  09/02/22 16:00  09/03/22 21:35





  Oxycodone /Acetaminophen 5-325mg Tab  PO   1 tab





  Q16H PRN   Administration





  Pain, Moderate (4-6)  


 


Pantoprazole Sodium  40 mg  09/03/22 10:00  09/03/22 11:21





  Pantoprazole 40 Mg Tab  PO   40 mg





  DAILY NICOLE   Administration


 


Sodium Chloride  10 ml  09/02/22 22:00  09/03/22 21:36





  Sodium Chloride 0.9% 10 Ml Flush Syringe  IV   10 ml





  BID NICOLE   Administration


 


Sodium Chloride  10 ml  09/02/22 15:34 





  Sodium Chloride 0.9% 10 Ml Flush Syringe  IV  09/07/22 15:33 





  PRN PRN  





  LINE FLUSH  


 


Spironolactone  50 mg  09/03/22 20:00  09/03/22 21:34





  Spironolactone 50 Mg Tab  PO   50 mg





  QDAY NICOLE   Administration


 


Thiamine HCl  100 mg  09/03/22 10:00  09/03/22 11:22





  Thiamine 100 Mg Tab  PO   100 mg





  QDAY NICOLE   Administration


 


Zinc Sulfate  220 mg  09/02/22 22:00  09/03/22 21:35





  Zinc Sulfate 220 Mg Cap  PO   220 mg





  BID NICOLE   Administration














Nutrition/Malnutrition Assess





- Dietary Evaluation


Nutrition/Malnutrition Findings: 


                                        





Nutrition Notes                                            Start:  09/03/22 

10:09


Freq:                                                      Status: Active       

 


Protocol:                                                                       

 


 Document     09/03/22 10:09  JENISE  (Rec: 09/03/22 10:35  JENISE  [id number])


 Nutrition Notes


     Need for Assessment generated from:         MD Order


     Initial or Follow up                        Assessment


     Current Diagnosis                           Hypertension


     Other Pertinent Diagnosis                   EtOH Dependence/Cirrhosis/


                                                 Ascites, Hepatic


                                                 Encephalopathy, COVID-19 pui..


                                                 .


     Current Diet                                NPO (since 09/02 15:36).


     Labs/Tests                                  09/03: Na 130, CL 96.0, CO2 19


                                                 , Glu 150.


     Pertinent Medications                       09/03: Vit C, Vit D3, Folic


                                                 acid, Multivitamins, Thiamine,


                                                 ZnSO4, others nutritionally


                                                 unremarkable.


     Height                                      5 ft 5 in


     Weight                                      117.9 kg


     Ideal Body Weight (kg)                      56.81


     BMI                                         43.2


     Intake Prior to Admission                   Good


     Weight change and time frame                Pt denies having loss body


                                                 weight PTA.


     Weight Status                               Morbidly Obese


     Subjective/Other Information                RD consult for Malnutrition


                                                 assessment.


                                                 Pt currently on NPO.


                                                 Pt is on Nasal Cannula. O2


                                                 saturation @ 96%, according to


                                                 Physical Assessment History


                                                 notes.


                                                 Pt presents Bilateral-LE Non-


                                                 Pitting Edema 2+, according to


                                                 Physical Assessment History


                                                 notes.


                                                 Pt complains about Abdominal


                                                 Distention increasing over the


                                                 last 2 weeks, according to


                                                 History & Physical notes.


                                                 Pt shows an unspecified


                                                 redness as sign of concern for


                                                 skin risk at the time,


                                                 according to Physical


                                                 Assessment History notes.


                                                 Pt shows no signs of concern


                                                 for risk of malnutrition at


                                                 the time, according to


                                                 Physical Assessment History


                                                 notes.


     Percent of energy/protein needs met:        Pt currently on NPO.


     Burn                                        Absent


     Trauma                                      Absent


     GI Symptoms                                 Other


     Food Allergy                                No


     Skin Integrity/Comment                      Unspecified redness.


     Current % PO                                Other


     Minimum of two criteria                     No


     Fluid Accumulation                          Mild (non-severe)


     Reduced  Strength                       N/A (non-severe)


     Protein-Calorie Malnutrition                N\A


     #1


      Nutrition Diagnosis                        Overweight/obesity


      Etiology                                   Possibly secondary to


                                                 Lifestyle.


      As Evidenced by Signs and Symptoms         BMI: 43.2 Kg/m2.


     Is patient on ventilator?                   No


     Is Patient Ambulatory and/or Out of Bed     No


     REE-(Pinch-St. Jeor-confined to bed)      2169.144


     Kcal/Kg value to use for calculation        13


     Approximate Energy Requirements Using       1533


      kcal/Kg                                    


     Calculation Used for Recommendations        Kcal/kg


     Additional Notes                            Protein: 0.6-0.8 g/Kg AdjBW;


                                                 53-70 g/day.


                                                 Fluids: 1 ml/Kcal, or as per


                                                 MD.


 Nutrition Intervention


     Change Diet Order:                          When pertinent, advance to


                                                 Cardiac Diet as tolerated.


     Goal #1                                     Adjust the dietary


                                                 intervention to better serve


                                                 Pt's needs and clinical


                                                 conditions during LOS.


     Follow-Up By:                               09/05/22


     Additional Comments                         When pertinent, start


                                                 monitoring food tolerance, %PO


                                                 intake of meals, and BM.

## 2022-09-03 NOTE — CONSULTATION
History of Present Illness





- Reason for Consult


Consult date: 09/03/22


Ascites


Requesting physician: SATNAM STUART





- History of Present Illness


Ms. Gannon is a 51-year-old woman who was brought to the emergency room due to

increasing abdominal distention and pedal edema.  She was seen on a telehealth 

visit by her primary physician on September 1 and advised to seek medical 

attention in the hospital.  Ms. Gannon has a complicated history and was 

diagnosed with cirrhosis in May of this year.  She underwent an elective 

cholecystectomy on April 28 at [facility] and was discharged a week

later.  Apparently she was quite weak and not able to walk at discharge and 

after 1 day, went to [facility].  There, she was diagnosed with 

Guillain-Barr syndrome although a spinal tap was not performed.  She was 

empirically given 5 days of IVIG.





Of note, during her [facility] stay, she had copious fluid drainage from one

of the trocar sites.  The bag was then removed but the area continued to leak.  

Another bag was placed over that site at [facility].  Ultimately,

the it sounds like they determined that there was a bile leak, and the patient 

was sent to [facility] for an ERCP with stent placement and then continued

her care at [facility].  After a prolonged stay there, she went to rehab 

for 3-1/2 weeks and then was transferred to home.  Patient does have an upcoming

appointment for stent removal at [facility].





During these days, she was diagnosed with cirrhosis of the liver which was 

attributed to alcohol since she used to drink 3-6 red stag drinks per night for 

years until her surgery.  Details of work-up in the [place] system are 

unavailable.   states that she did have problems with fluid overload 

while she was at [facility] and was treated aggressively with diuresis and

then developed acute kidney injury which was subsequently managed.  She was also

given lactulose there, but this was aggravating her underlying diarrhea and so 

she has not been taking it at home.





Patient has abdominal distention and progressive edema.  There is no abdominal 

pain, nausea, vomiting, or GI bleed.   notes that there was mild 

confusion and patient was therefore admitted for evaluation.  There is no 

history of fevers, chills, or sweats.








Past History


Past Medical History: hypertension, liver disease (Cirrhosis.  Ascites.)


Past Surgical History: cholecystectomy, Other (Biliary stent placement, 

presumably for bile leak, at [facility].)


Social history: , lives with family, alcohol abuse (Quit May 2022)


Family history: hypertension





Medications and Allergies


                                    Allergies











Allergy/AdvReac Type Severity Reaction Status Date / Time


 


codeine Allergy  Anaphylaxis Verified 09/02/22 10:11











                                Home Medications











 Medication  Instructions  Recorded  Confirmed  Last Taken  Type


 


Benzonatate [Tessalon Perles] 100 mg PO Q8HR #20 capsule 04/08/20 02/14/22 

Unknown Rx


 


Magnesium Oxide 400 mg PO DAILY 02/15/22 02/15/22 02/13/22 09:00 History


 


Metoprolol Xl [Metoprolol 50 mg PO QDAY 02/15/22 02/15/22 02/13/22 09:00 History





SUCCINATE ER TAB]     


 


Nortriptyline HCl [Pamelor] 75 mg PO DAILY 02/15/22 02/15/22 02/13/22 09:00 

History


 


Omeprazole 40 mg PO DAILY 02/15/22 02/15/22 02/13/22 09:00 History


 


Valsartan [Diovan] 40 mg PO DAILY 02/15/22 02/15/22 02/13/22 09:00 History


 


dilTIAZem HCl [Diltiazem 24Hr  mg PO QDAY 02/15/22 02/15/22 02/13/22 09:00

 History





(Cd)]     


 


Amoxicillin/Potassium Clav 1 each PO BID 3 Days #6 tab 02/16/22  Unknown Rx





[Augmentin 875-125 Tablet]     


 


Azithromycin 500 mg PO DAILY 2 Days #2 tab 02/16/22  Unknown Rx











Active Meds: 


Active Medications





Ascorbic Acid (Ascorbic Acid 500 Mg Tab)  500 mg PO BID [facility]


   Last Admin: 09/03/22 11:22 Dose:  500 mg


   


Azithromycin (Azithromycin 250 Mg Tab)  500 mg PO QDAY [facility]; Protocol


   Last Admin: 09/03/22 11:22 Dose:  500 mg


   


Cholecalciferol (Cholecalciferol (Vit D3) 1000 Unit (25 Mcg) Tab)  1,000 unit PO

QDAY [facility]


   Last Admin: 09/03/22 11:22 Dose:  1,000 unit


   


Dextrose (Dextrose 50% In Water (25gm) 50 Ml Syringe)  50 ml IV Q30MIN PRN; 

Protocol


   PRN Reason: Hypoglycemia


Folic Acid (Folic Acid 1 Mg Tab)  1 mg PO QDAY [facility]


   Last Admin: 09/03/22 11:21 Dose:  1 mg


   


Furosemide (Furosemide 40 Mg/4 Ml Inj)  20 mg IV ONCE [facility]


   Last Admin: 09/03/22 16:50 Dose:  20 mg


   


Furosemide (Furosemide 20 Mg/2 Ml Inj)  20 mg IV ONCE [facility]


Heparin Sodium (Porcine) (Heparin 5,000 Unit/1 Ml Vial)  5,000 unit SUB-Q Q12HR 

[facility]


   Last Admin: 09/03/22 11:21 Dose:  5,000 unit


   


Hydromorphone HCl (Hydromorphone 0.5 Mg/0.5 Ml Inj)  0.5 mg IV Q23H PRN


   PRN Reason: Pain , Severe (7-10)


Ceftriaxone Sodium (Rocephin/Ns 2 Gm/100 Ml)  2 gm in 100 mls @ 200 mls/hr IV 

Q24H [facility]; Protocol


   Last Admin: 09/03/22 16:51 Dose:  200 mls/hr


   


Insulin Human Lispro (Insulin Lispro 100 Unit/Ml)  0 unit SUB-Q Q6HR [facility]; 

Protocol


   Last Admin: 09/03/22 17:13 Dose:  1 unit


   


Lactulose (Lactulose 20 Gm/30 Ml Oral Liqd)  20 gm PO QDAY [facility]


   Last Admin: 09/03/22 11:21 Dose:  20 gm


   


Lorazepam (Lorazepam 2 Mg/Ml Vial)  2 mg IV Q1HR PRN


   PRN Reason: CIWA-Ar 8-15


Lorazepam (Lorazepam 2 Mg/Ml Vial)  4 mg IV Q1HR PRN


   PRN Reason: CIWA-Ar 16-25


Magnesium Oxide (Magnesium Oxide 400 Mg Tab)  400 mg PO DAILY [facility]


   Last Admin: 09/03/22 11:21 Dose:  400 mg


   


Methylprednisolone Sodium Succinate (Methylprednisolone Sod Succinate 40 Mg/1 Ml

Inj)  40 mg IV Q8HR [facility]


   Last Admin: 09/03/22 14:31 Dose:  40 mg


   


Multivitamins (Multivitamins ,Therapeutic Tab)  1 each PO QDAY [facility]


   Last Admin: 09/03/22 11:21 Dose:  1 each


   


Nortriptyline HCl (Nortriptyline 25 Mg Cap)  75 mg PO QDAY [facility]


   Last Admin: 09/03/22 11:22 Dose:  75 mg


   


Oxycodone/Acetaminophen (Oxycodone /Acetaminophen 5-325mg Tab)  1 tab PO Q16H 

PRN


   PRN Reason: Pain, Moderate (4-6)


Pantoprazole Sodium (Pantoprazole 40 Mg Tab)  40 mg PO DAILY [facility]


   Last Admin: 09/03/22 11:21 Dose:  40 mg


   


Sodium Chloride (Sodium Chloride 0.9% 10 Ml Flush Syringe)  10 ml IV BID [facility]


   Last Admin: 09/03/22 11:22 Dose:  10 ml


   


Sodium Chloride (Sodium Chloride 0.9% 10 Ml Flush Syringe)  10 ml IV PRN PRN


   PRN Reason: LINE FLUSH


   Stop: 09/07/22 15:33


Thiamine HCl (Thiamine 100 Mg Tab)  100 mg PO QDAY [facility]


   Last Admin: 09/03/22 11:22 Dose:  100 mg


   


Zinc Sulfate (Zinc Sulfate 220 Mg Cap)  220 mg PO BID [facility]


   Last Admin: 09/03/22 11:22 Dose:  220 mg


   











Review of Systems


All systems: negative (As per HPI)





Exam





- Constitutional


Vitals: 


                                        











Temp Pulse Resp BP Pulse Ox


 


 98.1 F   116 H  13   127/77   94 


 


 09/03/22 03:18  09/03/22 17:16  09/03/22 17:16  09/03/22 17:16  09/03/22 17:16











General appearance: Present: no acute distress





- EENT


Eyes: Present: PERRL, EOM intact


ENT: hearing intact





- Respiratory


Respiratory effort: normal


Respiratory: bilateral: CTA (Anteriorly)





- Cardiovascular


Rhythm: regular


Heart Sounds: Present: S1 & S2





- Extremities


Extremity abnormal: edema (4+, bilateral)





- Abdominal


General gastrointestinal: Present: non-tender, distended, normal bowel sounds, 

other (Dependent cellulitis on lower abdomen)





Results





- Labs


CBC & Chem 7: 


                                 09/03/22 04:28





                                 09/03/22 04:28


Labs: 


                              Abnormal lab results











  09/02/22 09/02/22 09/03/22 Range/Units





  22:33 22:33 04:28 


 


RDW    15.3 H  (13.2-15.2)  %


 


Seg Neuts % (Manual)    87.0 H  (40.0-70.0)  %


 


Lymphocytes % (Manual)    9.0 L  (13.4-35.0)  %


 


Seg Neutrophils # Man    7.8 H  (1.8-7.7)  K/mm3


 


Lymphocytes # (Manual)    0.8 L  (1.2-5.4)  K/mm3


 


PT     (12.2-14.9)  Sec.


 


INR     (0.87-1.13)  


 


APTT     (24.2-36.6)  Sec.


 


D-Dimer  2651.42 H    (0-234)  ng/mlDDU


 


Sodium     (137-145)  mmol/L


 


Chloride     ()  mmol/L


 


Carbon Dioxide     (22-30)  mmol/L


 


Glucose   110 H   ()  mg/dL


 


POC Glucose     ()  mg/dL


 


AST     (5-40)  units/L


 


Alkaline Phosphatase     ()  units/L


 


C-Reactive Protein   6.60 H   (0.00-1.30)  mg/dL


 


Total Protein     (6.3-8.2)  g/dL


 


Albumin     (3.9-5)  g/dL














  09/03/22 09/03/22 09/03/22 Range/Units





  04:28 09:50 11:24 


 


RDW     (13.2-15.2)  %


 


Seg Neuts % (Manual)     (40.0-70.0)  %


 


Lymphocytes % (Manual)     (13.4-35.0)  %


 


Seg Neutrophils # Man     (1.8-7.7)  K/mm3


 


Lymphocytes # (Manual)     (1.2-5.4)  K/mm3


 


PT   16.2 H   (12.2-14.9)  Sec.


 


INR   1.16 H   (0.87-1.13)  


 


APTT   37.7 H   (24.2-36.6)  Sec.


 


D-Dimer     (0-234)  ng/mlDDU


 


Sodium  130 L    (137-145)  mmol/L


 


Chloride  96.0 L    ()  mmol/L


 


Carbon Dioxide  19 L    (22-30)  mmol/L


 


Glucose  150 H    ()  mg/dL


 


POC Glucose    167 H  ()  mg/dL


 


AST  108 H    (5-40)  units/L


 


Alkaline Phosphatase  306 H    ()  units/L


 


C-Reactive Protein     (0.00-1.30)  mg/dL


 


Total Protein  5.9 L    (6.3-8.2)  g/dL


 


Albumin  2.7 L    (3.9-5)  g/dL














  09/03/22 Range/Units





  17:11 


 


RDW   (13.2-15.2)  %


 


Seg Neuts % (Manual)   (40.0-70.0)  %


 


Lymphocytes % (Manual)   (13.4-35.0)  %


 


Seg Neutrophils # Man   (1.8-7.7)  K/mm3


 


Lymphocytes # (Manual)   (1.2-5.4)  K/mm3


 


PT   (12.2-14.9)  Sec.


 


INR   (0.87-1.13)  


 


APTT   (24.2-36.6)  Sec.


 


D-Dimer   (0-234)  ng/mlDDU


 


Sodium   (137-145)  mmol/L


 


Chloride   ()  mmol/L


 


Carbon Dioxide   (22-30)  mmol/L


 


Glucose   ()  mg/dL


 


POC Glucose  186 H  ()  mg/dL


 


AST   (5-40)  units/L


 


Alkaline Phosphatase   ()  units/L


 


C-Reactive Protein   (0.00-1.30)  mg/dL


 


Total Protein   (6.3-8.2)  g/dL


 


Albumin   (3.9-5)  g/dL














- Imaging and Cardiology


CT scan - abdomen: report reviewed (Mildly nodular liver, moderate ascites.  CBD

stent in place.)





Assessment and Plan


1.  Abdominal distentionsecondary to ascites.  Patient has volume overload with

pedal edema as well.  She has been given a diagnosis of cirrhosis and it sounds 

like she was having ascites as well during her recent admissions.  Surprisingly,

her platelet count is normal.


 Obtain records


 Large-volume paracentesis, and check for SBP


 Sodium restriction


 Initiate Aldactone in addition to the home Lasix that she takes.  May need IV 

diuretics, and monitor renal function closely.





2.  Cirrhosisby history.  If so, likely due to alcohol and possibly due to 

fatty liver disease.


 Check recent hospitalization records for evaluation





3.  Confusionhusband relates what sounds like a history of encephalopathy, and 

patient was on lactulose before.  This is not well-tolerated given her IBS and 

diarrhea.


 Check ammonia


 Initiate Xifaxan





4.  Biliary stentpresumably due to bile leak.


 Check records


 Will not address at this admission since patient already has outpatient 

appointment for stent removal at [facility]

## 2022-09-04 LAB
ALBUMIN SERPL-MCNC: 2.8 G/DL (ref 3.9–5)
ALT SERPL-CCNC: 35 UNITS/L (ref 7–56)
BUN SERPL-MCNC: 14 MG/DL (ref 7–17)
BUN/CREAT SERPL: 18 %
CALCIUM SERPL-MCNC: 8.4 MG/DL (ref 8.4–10.2)
HEMOLYSIS INDEX: 3

## 2022-09-04 PROCEDURE — 0W9G3ZZ DRAINAGE OF PERITONEAL CAVITY, PERCUTANEOUS APPROACH: ICD-10-PCS

## 2022-09-04 RX ADMIN — OXYCODONE HYDROCHLORIDE AND ACETAMINOPHEN SCH MG: 500 TABLET ORAL at 10:06

## 2022-09-04 RX ADMIN — LACTULOSE SCH GM: 20 SOLUTION ORAL at 10:05

## 2022-09-04 RX ADMIN — RIFAXIMIN SCH MG: 550 TABLET ORAL at 22:19

## 2022-09-04 RX ADMIN — MULTIVITAMIN TABLET SCH EACH: TABLET at 10:06

## 2022-09-04 RX ADMIN — HEPARIN SODIUM SCH UNIT: 5000 INJECTION, SOLUTION INTRAVENOUS; SUBCUTANEOUS at 10:06

## 2022-09-04 RX ADMIN — METHYLPREDNISOLONE SODIUM SUCCINATE SCH MG: 40 INJECTION, POWDER, FOR SOLUTION INTRAMUSCULAR; INTRAVENOUS at 14:15

## 2022-09-04 RX ADMIN — Medication SCH UNIT: at 10:06

## 2022-09-04 RX ADMIN — FOLIC ACID SCH MG: 1 TABLET ORAL at 10:06

## 2022-09-04 RX ADMIN — AZITHROMYCIN SCH MG: 250 TABLET, FILM COATED ORAL at 10:06

## 2022-09-04 RX ADMIN — Medication SCH MG: at 10:06

## 2022-09-04 RX ADMIN — INSULIN LISPRO SCH UNIT: 100 INJECTION, SOLUTION INTRAVENOUS; SUBCUTANEOUS at 01:11

## 2022-09-04 RX ADMIN — METHYLPREDNISOLONE SODIUM SUCCINATE SCH MG: 40 INJECTION, POWDER, FOR SOLUTION INTRAMUSCULAR; INTRAVENOUS at 22:19

## 2022-09-04 RX ADMIN — INSULIN LISPRO SCH: 100 INJECTION, SOLUTION INTRAVENOUS; SUBCUTANEOUS at 12:52

## 2022-09-04 RX ADMIN — CEFTRIAXONE SODIUM SCH MLS/HR: 2 INJECTION, POWDER, FOR SOLUTION INTRAMUSCULAR; INTRAVENOUS at 19:22

## 2022-09-04 RX ADMIN — INSULIN LISPRO SCH: 100 INJECTION, SOLUTION INTRAVENOUS; SUBCUTANEOUS at 06:45

## 2022-09-04 RX ADMIN — PANTOPRAZOLE SODIUM SCH MG: 40 TABLET, DELAYED RELEASE ORAL at 10:06

## 2022-09-04 RX ADMIN — OXYCODONE HYDROCHLORIDE AND ACETAMINOPHEN SCH MG: 500 TABLET ORAL at 22:19

## 2022-09-04 RX ADMIN — OXYCODONE AND ACETAMINOPHEN PRN TAB: 5; 325 TABLET ORAL at 22:19

## 2022-09-04 RX ADMIN — SPIRONOLACTONE SCH MG: 50 TABLET ORAL at 10:07

## 2022-09-04 RX ADMIN — RIFAXIMIN SCH MG: 550 TABLET ORAL at 14:15

## 2022-09-04 RX ADMIN — Medication SCH ML: at 22:23

## 2022-09-04 RX ADMIN — HEPARIN SODIUM SCH UNIT: 5000 INJECTION, SOLUTION INTRAVENOUS; SUBCUTANEOUS at 22:23

## 2022-09-04 RX ADMIN — NORTRIPTYLINE HYDROCHLORIDE SCH MG: 25 CAPSULE ORAL at 10:06

## 2022-09-04 RX ADMIN — Medication SCH ML: at 10:06

## 2022-09-04 RX ADMIN — METHYLPREDNISOLONE SODIUM SUCCINATE SCH MG: 40 INJECTION, POWDER, FOR SOLUTION INTRAMUSCULAR; INTRAVENOUS at 06:45

## 2022-09-04 RX ADMIN — INSULIN LISPRO SCH: 100 INJECTION, SOLUTION INTRAVENOUS; SUBCUTANEOUS at 18:45

## 2022-09-04 RX ADMIN — FUROSEMIDE SCH MG: 10 INJECTION, SOLUTION INTRAVENOUS at 10:06

## 2022-09-04 NOTE — PROGRESS NOTE
Assessment and Plan


1.  Abdominal distentionsecondary to ascites.  Patient has volume overload with

pedal edema as well.  She has been given a diagnosis of cirrhosis and it sounds 

like she was having ascites as well during her recent admissions.  Surprisingly,

her platelet count is normal.


 Obtain records


 Large-volume paracentesis, and check for SBPordered and awaiting completion


 Sodium restriction


 Initiate Aldactone in addition to the home Lasix that she takes.  May need IV 

diuretics, and monitor renal function closely.





2.  Cirrhosisby history.  If so, likely due to alcohol and possibly due to 

fatty liver disease.


 Check recent hospitalization records for evaluation





3.  Confusionhusband relates what sounds like a history of encephalopathy, and 

patient was on lactulose before.  This is not well-tolerated given her IBS and 

diarrhea.  Ammonia level normal now at 26.


 Initiate Xifaxan





4.  Biliary stentpresumably due to bile leak.


 Check records


 Will not address at this admission since patient already has outpatient 

appointment for stent removal at [facility]





5.  Left basilar pneumonia








Subjective


Date of service: 09/04/22


Interval history: 


Patient in bed.  States she feels better.  No complaints.








Objective





- Constitutional


Vitals: 


                               Vital Signs - 12hr











  09/04/22 09/04/22 09/04/22





  03:00 03:40 04:00


 


Temperature   


 


Pulse Rate 108 H 103 H 97 H


 


Pulse Rate [   97 H





From Monitor]   


 


Respiratory 13  9 L





Rate   


 


Blood Pressure 127/97  134/95


 


O2 Sat by Pulse 97  97





Oximetry   














  09/04/22 09/04/22 09/04/22





  05:00 06:00 07:00


 


Temperature   


 


Pulse Rate 106 H 107 H 108 H


 


Pulse Rate [   





From Monitor]   


 


Respiratory 11 L 10 L 11 L





Rate   


 


Blood Pressure 136/97 139/90 141/102


 


O2 Sat by Pulse 97 96 97





Oximetry   














  09/04/22 09/04/22 09/04/22





  07:17 08:00 09:00


 


Temperature 97.5 F L  


 


Pulse Rate  110 H 116 H


 


Pulse Rate [   





From Monitor]   


 


Respiratory  12 11 L





Rate   


 


Blood Pressure  142/87 142/87


 


O2 Sat by Pulse  98 96





Oximetry   














  09/04/22 09/04/22 09/04/22





  10:00 10:07 11:00


 


Temperature   


 


Pulse Rate 110 H 112 H 115 H


 


Pulse Rate [   





From Monitor]   


 


Respiratory 13  15





Rate   


 


Blood Pressure 142/87 145/94 153/95


 


O2 Sat by Pulse 97  97





Oximetry   














  09/04/22





  12:18


 


Temperature 97.9 F


 


Pulse Rate 


 


Pulse Rate [ 





From Monitor] 


 


Respiratory 





Rate 


 


Blood Pressure 


 


O2 Sat by Pulse 





Oximetry 











General appearance: Present: no acute distress





- EENT


Eyes: PERRL, EOM intact


ENT: hearing intact





- Respiratory


Respiratory effort: normal





- Cardiovascular


Rhythm: regular


Heart Sounds: Present: S1 & S2


Extremity abnormal: edema (4+)





- Gastrointestinal


General gastrointestinal: Present: soft, non-tender





- Labs


CBC & Chem 7: 


                                 09/03/22 04:28





                                 09/04/22 05:10


Labs: 


                              Abnormal lab results











  09/03/22 09/03/22 09/04/22 Range/Units





  11:24 17:11 05:10 


 


Sodium    129 L  (137-145)  mmol/L


 


Chloride    93.8 L  ()  mmol/L


 


Glucose    145 H  ()  mg/dL


 


POC Glucose  167 H  186 H   ()  mg/dL


 


AST    67 H  (5-40)  units/L


 


Alkaline Phosphatase    290 H  ()  units/L


 


Albumin    2.8 L  (3.9-5)  g/dL














Medications & Allergies





- Medications


Allergies/Adverse Reactions: 


                                    Allergies





codeine Allergy (Verified 09/02/22 10:11)


   Anaphylaxis








Home Medications: 


                                Home Medications











 Medication  Instructions  Recorded  Confirmed  Last Taken  Type


 


Benzonatate [Tessalon Perles] 100 mg PO Q8HR #20 capsule 04/08/20 02/14/22 

Unknown Rx


 


Magnesium Oxide 400 mg PO DAILY 02/15/22 02/15/22 02/13/22 09:00 History


 


Metoprolol Xl [Metoprolol 50 mg PO QDAY 02/15/22 02/15/22 02/13/22 09:00 History





SUCCINATE ER TAB]     


 


Nortriptyline HCl [Pamelor] 75 mg PO DAILY 02/15/22 02/15/22 02/13/22 09:00 

History


 


Omeprazole 40 mg PO DAILY 02/15/22 02/15/22 02/13/22 09:00 History


 


Valsartan [Diovan] 40 mg PO DAILY 02/15/22 02/15/22 02/13/22 09:00 History


 


dilTIAZem HCl [Diltiazem 24Hr  mg PO QDAY 02/15/22 02/15/22 02/13/22 09:00

 History





(Cd)]     


 


Amoxicillin/Potassium Clav 1 each PO BID 3 Days #6 tab 02/16/22  Unknown Rx





[Augmentin 875-125 Tablet]     


 


Azithromycin 500 mg PO DAILY 2 Days #2 tab 02/16/22  Unknown Rx











Active Medications: 














Generic Name Dose Route Start Last Admin





  Trade Name Freq  PRN Reason Stop Dose Admin


 


Ascorbic Acid  500 mg  09/02/22 22:00  09/04/22 10:06





  Ascorbic Acid 500 Mg Tab  PO   500 mg





  BID NICOLE   Administration


 


Azithromycin  500 mg  09/03/22 10:00  09/04/22 10:06





  Azithromycin 250 Mg Tab  PO  09/07/22 10:01  500 mg





  QDAY NICOLE   Administration





  Protocol  


 


Dextrose  50 ml  09/03/22 09:09 





  Dextrose 50% In Water (25gm) 50 Ml Syringe  IV  





  Q30MIN PRN  





  Hypoglycemia  





  Protocol  


 


Folic Acid  1 mg  09/03/22 10:00  09/04/22 10:06





  Folic Acid 1 Mg Tab  PO   1 mg





  QDAY NICOLE   Administration


 


Furosemide  40 mg  09/04/22 10:00  09/04/22 10:06





  Furosemide 40 Mg/4 Ml Inj  IV   40 mg





  QDAY NICOLE   Administration


 


Heparin Sodium (Porcine)  5,000 unit  09/02/22 22:00  09/04/22 10:06





  Heparin 5,000 Unit/1 Ml Vial  SUB-Q   5,000 unit





  Q12HR NICOLE   Administration


 


Hydromorphone HCl  0.5 mg  09/02/22 16:00 





  Hydromorphone 0.5 Mg/0.5 Ml Inj  IV  





  Q23H PRN  





  Pain , Severe (7-10)  


 


Ceftriaxone Sodium  2 gm in 100 mls @ 200 mls/hr  09/02/22 16:00  09/03/22 16:51





  Rocephin/Ns 2 Gm/100 Ml  IV  09/06/22 16:29  200 mls/hr





  Q24H NICOLE   Administration





  Protocol  


 


Insulin Human Lispro  0 unit  09/03/22 12:00  09/04/22 12:52





  Insulin Lispro 100 Unit/Ml  SUB-Q   Not Given





  Q6HR NICOLE  





  Protocol  


 


Lactulose  20 gm  09/03/22 10:00  09/04/22 10:05





  Lactulose 20 Gm/30 Ml Oral Liqd  PO   20 gm





  QDAY NICOLE   Administration


 


Lorazepam  2 mg  09/02/22 16:02 





  Lorazepam 2 Mg/Ml Vial  IV  





  Q1HR PRN  





  CIWA-Ar 8-15  


 


Lorazepam  4 mg  09/02/22 16:02 





  Lorazepam 2 Mg/Ml Vial  IV  





  Q1HR PRN  





  CIWA-Ar 16-25  


 


Magnesium Oxide  400 mg  09/03/22 10:00  09/04/22 10:06





  Magnesium Oxide 400 Mg Tab  PO   400 mg





  DAILY NICOLE   Administration


 


Methylprednisolone Sodium Succinate  40 mg  09/02/22 22:00  09/04/22 14:15





  Methylprednisolone Sod Succinate 40 Mg/1 Ml Inj  IV   40 mg





  Q8HR NICOLE   Administration


 


Multivitamins  1 each  09/03/22 10:00  09/04/22 10:06





  Multivitamins ,Therapeutic Tab  PO   1 each





  QDAY NICOLE   Administration


 


Nortriptyline HCl  75 mg  09/03/22 10:00  09/04/22 10:06





  Nortriptyline 25 Mg Cap  PO   75 mg





  QDAY NICOLE   Administration


 


Oxycodone/Acetaminophen  1 tab  09/02/22 16:00  09/03/22 21:35





  Oxycodone /Acetaminophen 5-325mg Tab  PO   1 tab





  Q16H PRN   Administration





  Pain, Moderate (4-6)  


 


Pantoprazole Sodium  40 mg  09/03/22 10:00  09/04/22 10:06





  Pantoprazole 40 Mg Tab  PO   40 mg





  DAILY NICOLE   Administration


 


Rifaximin  550 mg  09/04/22 10:00  09/04/22 14:15





  Rifaximin 550 Mg Tab  PO   550 mg





  BID NICOLE   Administration


 


Sodium Chloride  10 ml  09/02/22 22:00  09/04/22 10:06





  Sodium Chloride 0.9% 10 Ml Flush Syringe  IV   10 ml





  BID NICOLE   Administration


 


Sodium Chloride  10 ml  09/02/22 15:34 





  Sodium Chloride 0.9% 10 Ml Flush Syringe  IV  09/07/22 15:33 





  PRN PRN  





  LINE FLUSH  


 


Spironolactone  50 mg  09/03/22 20:00  09/04/22 10:07





  Spironolactone 50 Mg Tab  PO   50 mg





  QDAY NICOLE   Administration


 


Thiamine HCl  100 mg  09/03/22 10:00  09/04/22 10:06





  Thiamine 100 Mg Tab  PO   100 mg





  QDAY NICOLE   Administration














HEART Score





- HEART Score


Troponin: 


                                        











Troponin T  < 0.010 ng/mL (0.00-0.029)   09/02/22  Unknown

## 2022-09-04 NOTE — PROGRESS NOTE
Assessment and Plan


Assessment and plan: 


This is a 51-year-old female with known past medical history of ETOH abuse, 

liver cirrhosis s/p biliary stent placement, HTN, nicotine dependence, Guillan 

Prospect Syndrome, IBS, and Obesity Hypoventilation Syndrome admitted for liver 

cirrhosis with ascites, acute hypoxic respiratory failure, and LLL pneumonia.





Hospital Course to Date:


9/3: Mentation improved, fully AAO this am. Desated in the 80s overnight, now 2L

NC, no respiratory distress noted. Patient remains afebrile and VSS, cultures 

pending, continue empiric IV Abx. Anasarca and +4 pitting edema noted, X1 dose 

of low dose IV Lasix ordered, patient is on IV steroids. Continue PO lactulose 

and PPI. GI consult pending. Patient also reported that she has been bedbound 

since April of this year. D-dimer is elevated, will also check BLE doppler to 

r/o DVT. Continue to trend LFTs. Get records from PCP and Avinash Godinez.








9/4: imaging study from earlier reviewed, CT scan - abdomen: report reviewed 

(Mildly nodular liver, moderate ascites.  CBD stent in place.)


Patient still with increasing abdominal girth, GI input noted, patient started 

on Lasix and Rifaxmin. [facility] Plan for Paracentesis earliest possible time and rule out SBP. Will 

start on Empiric abx coverage for now. Sodium restrictions. 


Continue IMCU care at this time. 


Monitor Electrolytes with initiation of Diuretics. 








Assessment and Plan


#Acute Hypoxic Respiratory Failure


#LLL Pneumonia (CAP)


- Desated in the 80s overnight, now on 2L NC


- CXR suggesting left basilar pneumonia


- COVID PCR pending


- Empiric IV Abx initiated- Azithmarylou rocephin


- Patient is afebrile, VSS, and cultures pending


- Continue O2 supplementation and wean as tolerated


- Continue SPO2 monitoring for SPO2 goal above 92%


- F/U on cultures


- Daily CBC monitor





#Alcoholic Cirrhosis of Liver with Ascites


- Generalized anasarca and +4 pitting edema noted


- CTabd/pelvis reviewed


- X1 dose of IV lasix ordered


- On PO lactulose, IV steroids, and PPI


- GI consulted


- Check abdominal US to assess need for possible paracentesis


- Continue to trend LFTs


- of noted,patient endorse that she has not drink for over a year and a half





#Acute Hepatic Encephalopathy


#Hyperammonemia 


#H/o ETOH Abuse


- Presented with AMS, ammonia level mildly elevated at 61


- Most likely related to above


- CT head/brain with no acute intracranial abnormality


- On PO Lactulose, mentation improved, Fully AAO this am


- Patient reported she has not drink any alcohol for a years and a half


- On Thiamine, folic acid, multivitamin daily, and PRN CIWA protocol


- Avoid delirium


- PRN Analgesia for pain control


- Maintenance of sleep-wake cycle





#Hyponatremia


#Fluid Overload


- Gentle diurese, X1 dose of IV lasix


- Monitor and replace electrolytes as needed


- Trend BMP





#Hyperglycemia


- Per patient no previous history of DM, probably due to IV steroids


- BG check and SSI initiated Q6hrs


- Avoid hypoglycemia


- Check hgba1c





#Elevated D-Dimer


- COVID PCR pending


- Check BLE doppler to r/o DVT


- Heparin subQ





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


- Chronic, no active treatment at this time.  


- Treated with IVIG in April 2022 for 5 days


- No clinical symptoms present at this time.  Continue supportive measures


- Outpatient neurology follow-up





#Nicotine Dependence


- Current daily 1 pack a day cigarettes smoker for over 20 years


- Smoking cessation education and quitline resources provided


- Patient denied any urges at this time, Nicotine patch as needed





#Obesity Hypoventilation Syndrome


- Balanced diet, increase physical activity discharge


- outpatient pulmonary follow-up for sleep study.  


- Noninvasive positive pressure ventilation as clinically indicated.





#GI/DVT Prophylaxis


- PPI- protonix


- Heparin SubQ





#Advance Care Planning 


- Disease education data, care plan, diagnoses, and prognosis were discussed 

with patient at the bedside. Patient is a FULL code.  Patient acknowledged 

understanding and agreed with current care plan.





 


The high probability of a clinically significant, sudden or life threatening 

deterioration of the [multiple] system(s) required my full and direct attention,

 intervention and personal management. The aggregate critical care time was [60]

 minutes. This time is in addition to time spent performing reported procedures 

but includes the following: 





[x] Data Review and interpretation 





[x] Patient assessment and monitoring of vital signs 





[x] Documentation 





[x] Medication orders and management





Disposition Plan: IMCU


Total Time Spent with Patient (Minutes): 60























History


Interval history: 


Patient seen and examined at the bedside. Fully AAO. No acute event overnight, 

continues on 2L NC with no further desaturation noted, denied any pain nor disco

mfort at this time. Anasarca and 4+ pitting BLE edema noted, VSS. REINALDO overnight.








Hospitalist Physical





- Physical exam


Narrative exam: 


General appearance: Present: no acute distress, well-nourished, obese, 

leathergic


- EENT


Eyes: Present: PERRL, EOM intact


ENT: hearing intact





- Neck


Neck: Present: normal ROM





- Respiratory


Respiratory effort: normal


Respiratory: bilateral: diminished





- Cardiovascular


Rhythm: regular


Heart Sounds: Present: S1 & S2





- Extremities


Extremities: no ischemia, pulses intact, pulses symmetrical


Extremity abnormal: edema





- Peripheral Assessment


  ** Bilateral Lower Extremity


Edema Type: Pitting


Edema Degree: 4+


Capillary Refill: < 3 seconds


Skin Temperature: Warm





  ** Generalized


Edema Type: Pitting


Edema Degree: 4+


Capillary Refill: < 3 seconds


Skin Temperature: Warm


Peripheral Pulses: within normal limits





- Abdominal


General gastrointestinal: soft, distended, normal bowel sounds, other 

(Anasarca), lower abd redness, not tender





- Integumentary


Integumentary: Present: warm, dry, erythema





- Psychiatric


Psychiatric: appropriate mood/affect, cooperative





- Neurologic


Neurologic: CNII-XII intact, moves all extremities (Generalized weakness)











- Constitutional


Vitals: 


                                        











Temp Pulse Resp BP Pulse Ox


 


 97.5 F L  108 H  11 L  141/102   97 


 


 09/04/22 07:17  09/04/22 07:00  09/04/22 07:00  09/04/22 07:00  09/04/22 07:00











General appearance: Present: no acute distress





HEART Score





- HEART Score


Troponin: 


                                        











Troponin T  < 0.010 ng/mL (0.00-0.029)   09/02/22  Unknown














Results





- Labs


CBC & Chem 7: 


                                 09/03/22 04:28





                                 09/04/22 05:10


Labs: 


                             Laboratory Last Values











WBC  9.0 K/mm3 (4.5-11.0)   09/03/22  04:28    


 


RBC  3.86 M/mm3 (3.65-5.03)   09/03/22  04:28    


 


Hgb  11.6 gm/dl (10.1-14.3)   09/03/22  04:28    


 


Hct  34.1 % (30.3-42.9)   09/03/22  04:28    


 


MCV  88 fl (79-97)   09/03/22  04:28    


 


MCH  30 pg (28-32)   09/03/22  04:28    


 


MCHC  34 % (30-34)   09/03/22  04:28    


 


RDW  15.3 % (13.2-15.2)  H  09/03/22  04:28    


 


Plt Count  245 K/mm3 (140-440)   09/03/22  04:28    


 


Lymph % (Auto)  25.3 % (13.4-35.0)   09/02/22  Unknown


 


Mono % (Auto)  9.9 % (0.0-7.3)  H  09/02/22  Unknown


 


Eos % (Auto)  1.2 % (0.0-4.3)   09/02/22  Unknown


 


Baso % (Auto)  0.6 % (0.0-1.8)   09/02/22  Unknown


 


Lymph # (Auto)  2.6 K/mm3 (1.2-5.4)   09/02/22  Unknown


 


Mono # (Auto)  1.0 K/mm3 (0.0-0.8)  H  09/02/22  Unknown


 


Eos # (Auto)  0.1 K/mm3 (0.0-0.4)   09/02/22  Unknown


 


Baso # (Auto)  0.1 K/mm3 (0.0-0.1)   09/02/22  Unknown


 


Add Manual Diff  Complete   09/03/22  04:28    


 


Total Counted  100   09/03/22  04:28    


 


Seg Neutrophils %  Np   09/03/22  04:28    


 


Seg Neuts % (Manual)  87.0 % (40.0-70.0)  H  09/03/22  04:28    


 


Band Neutrophils %  0 %  09/03/22  04:28    


 


Lymphocytes % (Manual)  9.0 % (13.4-35.0)  L  09/03/22  04:28    


 


Reactive Lymphs % (Man)  0 %  09/03/22  04:28    


 


Monocytes % (Manual)  4.0 % (0.0-7.3)   09/03/22  04:28    


 


Eosinophils % (Manual)  0 % (0.0-4.3)   09/03/22  04:28    


 


Basophils % (Manual)  0 % (0.0-1.8)   09/03/22  04:28    


 


Metamyelocytes %  0 %  09/03/22  04:28    


 


Myelocytes %  0 %  09/03/22  04:28    


 


Promyelocytes %  0 %  09/03/22  04:28    


 


Blast Cells %  0 %  09/03/22  04:28    


 


Nucleated RBC %  Not Reportable   09/03/22  04:28    


 


Seg Neutrophils #  6.4 K/mm3 (1.8-7.7)   09/02/22  Unknown


 


Seg Neutrophils # Man  7.8 K/mm3 (1.8-7.7)  H  09/03/22  04:28    


 


Band Neutrophils #  0.0 K/mm3  09/03/22  04:28    


 


Lymphocytes # (Manual)  0.8 K/mm3 (1.2-5.4)  L  09/03/22  04:28    


 


Abs React Lymphs (Man)  0.0 K/mm3  09/03/22  04:28    


 


Monocytes # (Manual)  0.4 K/mm3 (0.0-0.8)   09/03/22  04:28    


 


Eosinophils # (Manual)  0.0 K/mm3 (0.0-0.4)   09/03/22  04:28    


 


Basophils # (Manual)  0.0 K/mm3 (0.0-0.1)   09/03/22  04:28    


 


Metamyelocytes #  0.0 K/mm3  09/03/22  04:28    


 


Myelocytes #  0.0 K/mm3  09/03/22  04:28    


 


Promyelocytes #  0.0 K/mm3  09/03/22  04:28    


 


Blast Cells #  0.0 K/mm3  09/03/22  04:28    


 


WBC Morphology  Not Reportable   09/03/22  04:28    


 


Hypersegmented Neuts  Not Reportable   09/03/22  04:28    


 


Hyposegmented Neuts  Not Reportable   09/03/22  04:28    


 


Hypogranular Neuts  Not Reportable   09/03/22  04:28    


 


Smudge Cells  Not Reportable   09/03/22  04:28    


 


Toxic Granulation  Not Reportable   09/03/22  04:28    


 


Toxic Vacuolation  Not Reportable   09/03/22  04:28    


 


Dohle Bodies  Not Reportable   09/03/22  04:28    


 


Pelger-Huet Anomaly  Not Reportable   09/03/22  04:28    


 


Brown Rods  Not Reportable   09/03/22  04:28    


 


Platelet Estimate  Not Reportable   09/03/22  04:28    


 


Clumped Platelets  Not Reportable   09/03/22  04:28    


 


Plt Clumps, EDTA  Not Reportable   09/03/22  04:28    


 


Large Platelets  Not Reportable   09/03/22  04:28    


 


Giant Platelets  Not Reportable   09/03/22  04:28    


 


Platelet Satelliting  Not Reportable   09/03/22  04:28    


 


Plt Morphology Comment  Not Reportable   09/03/22  04:28    


 


RBC Morphology  Normal   09/03/22  04:28    


 


Dimorphic RBCs  Not Reportable   09/03/22  04:28    


 


Polychromasia  Not Reportable   09/03/22  04:28    


 


Hypochromasia  Not Reportable   09/03/22  04:28    


 


Poikilocytosis  Not Reportable   09/03/22  04:28    


 


Anisocytosis  Not Reportable   09/03/22  04:28    


 


Microcytosis  Not Reportable   09/03/22  04:28    


 


Macrocytosis  Not Reportable   09/03/22  04:28    


 


Spherocytes  Not Reportable   09/03/22  04:28    


 


Pappenheimer Bodies  Not Reportable   09/03/22  04:28    


 


Sickle Cells  Not Reportable   09/03/22  04:28    


 


Target Cells  Not Reportable   09/03/22  04:28    


 


Tear Drop Cells  Not Reportable   09/03/22  04:28    


 


Ovalocytes  Not Reportable   09/03/22  04:28    


 


Helmet Cells  Not Reportable   09/03/22  04:28    


 


[place] Bodies  Not Reportable   09/03/22  04:28    


 


Cabot Rings  Not Reportable   09/03/22  04:28    


 


[place] Cells  Not Reportable   09/03/22  04:28    


 


Bite Cells  Not Reportable   09/03/22  04:28    


 


Crenated Cell  Not Reportable   09/03/22  04:28    


 


Elliptocytes  Not Reportable   09/03/22  04:28    


 


Acanthocytes (Spur)  Not Reportable   09/03/22  04:28    


 


Rouleaux  Not Reportable   09/03/22  04:28    


 


Hemoglobin C Crystals  Not Reportable   09/03/22  04:28    


 


Schistocytes  Not Reportable   09/03/22  04:28    


 


Malaria parasites  Not Reportable   09/03/22  04:28    


 


Bonifacio Bodies  Not Reportable   09/03/22  04:28    


 


Hem Pathologist Commnt  No   09/03/22  04:28    


 


PT  16.2 Sec. (12.2-14.9)  H  09/03/22  09:50    


 


INR  1.16  (0.87-1.13)  H  09/03/22  09:50    


 


APTT  37.7 Sec. (24.2-36.6)  H  09/03/22  09:50    


 


D-Dimer  2651.42 ng/mlDDU (0-234)  H  09/02/22  22:33    


 


Sodium  129 mmol/L (137-145)  L  09/04/22  05:10    


 


Potassium  4.2 mmol/L (3.6-5.0)   09/04/22  05:10    


 


Chloride  93.8 mmol/L ()  L  09/04/22  05:10    


 


Carbon Dioxide  24 mmol/L (22-30)   09/04/22  05:10    


 


Anion Gap  15 mmol/L  09/04/22  05:10    


 


BUN  14 mg/dL (7-17)   09/04/22  05:10    


 


Creatinine  0.8 mg/dL (0.6-1.2)   09/04/22  05:10    


 


Estimated GFR  > 60 ml/min  09/04/22  05:10    


 


BUN/Creatinine Ratio  18 %  09/04/22  05:10    


 


Glucose  145 mg/dL ()  H  09/04/22  05:10    


 


POC Glucose  186 mg/dL ()  H  09/03/22  17:11    


 


Calcium  8.4 mg/dL (8.4-10.2)   09/04/22  05:10    


 


Phosphorus  4.40 mg/dL (2.5-4.5)   09/04/22  05:10    


 


Magnesium  1.80 mg/dL (1.7-2.3)   09/04/22  05:10    


 


Total Bilirubin  0.70 mg/dL (0.1-1.2)   09/04/22  05:10    


 


AST  67 units/L (5-40)  H  09/04/22  05:10    


 


ALT  35 units/L (7-56)   09/04/22  05:10    


 


Alkaline Phosphatase  290 units/L ()  H  09/04/22  05:10    


 


Ammonia  26.0 umol/L (25-60)   09/04/22  08:28    


 


Lactate Dehydrogenase  144 units/L ()   09/02/22  22:33    


 


Troponin T  < 0.010 ng/mL (0.00-0.029)   09/02/22  Unknown


 


C-Reactive Protein  6.60 mg/dL (0.00-1.30)  H  09/02/22  22:33    


 


Total Protein  6.3 g/dL (6.3-8.2)   09/04/22  05:10    


 


Albumin  2.8 g/dL (3.9-5)  L  09/04/22  05:10    


 


Albumin/Globulin Ratio  0.8 %  09/04/22  05:10    


 


Procalcitonin  0.37 ng/mL (<0.15)   09/02/22  22:33    


 


Plasma/Serum Alcohol  < 0.01 % (0-0.07)   09/02/22  15:56    


 


Coronavirus (PCR)  Negative  (Negative)   09/02/22  17:50    











Microbiology: 


Microbiology





09/02/22 22:33   Peripheral/Venous   Blood Culture - Preliminary


                            NO GROWTH AFTER 24 HOURS


09/02/22 22:44   Peripheral/Venous   Blood Culture - Preliminary


                            NO GROWTH AFTER 24 HOURS








Hurt/IV: 


                                        





Voiding Method                   External Female Catheter











Active Medications





- Current Medications


Current Medications: 














Generic Name Dose Route Start Last Admin





  Trade Name Fre  PRN Reason Stop Dose Admin


 


Ascorbic Acid  500 mg  09/02/22 22:00  09/03/22 21:35





  Ascorbic Acid 500 Mg Tab  PO   500 mg





  BID NICOLE   Administration


 


Azithromycin  500 mg  09/03/22 10:00  09/03/22 11:22





  Azithromycin 250 Mg Tab  PO   500 mg





  QDAY NICOLE   Administration





  Protocol  


 


Cholecalciferol  1,000 unit  09/03/22 10:00  09/03/22 11:22





  Cholecalciferol (Vit D3) 1000 Unit (25 Mcg) Tab  PO   1,000 unit





  QDAY NICOLE   Administration


 


Dextrose  50 ml  09/03/22 09:09 





  Dextrose 50% In Water (25gm) 50 Ml Syringe  IV  





  Q30MIN PRN  





  Hypoglycemia  





  Protocol  


 


Folic Acid  1 mg  09/03/22 10:00  09/03/22 11:21





  Folic Acid 1 Mg Tab  PO   1 mg





  QDAY NICOLE   Administration


 


Furosemide  20 mg  09/03/22 17:00  09/03/22 16:50





  Furosemide 40 Mg/4 Ml Inj  IV   20 mg





  ONCE NICOLE   Administration


 


Furosemide  20 mg  09/04/22 06:00 





  Furosemide 20 Mg/2 Ml Inj  IV  





  ONCE NICOLE  


 


Furosemide  40 mg  09/04/22 10:00 





  Furosemide 40 Mg/4 Ml Inj  IV  





  QDAY NICOLE  


 


Heparin Sodium (Porcine)  5,000 unit  09/02/22 22:00  09/03/22 21:35





  Heparin 5,000 Unit/1 Ml Vial  SUB-Q   5,000 unit





  Q12HR NICOLE   Administration


 


Hydromorphone HCl  0.5 mg  09/02/22 16:00 





  Hydromorphone 0.5 Mg/0.5 Ml Inj  IV  





  Q23H PRN  





  Pain , Severe (7-10)  


 


Ceftriaxone Sodium  2 gm in 100 mls @ 200 mls/hr  09/02/22 16:00  09/03/22 16:51





  Rocephin/Ns 2 Gm/100 Ml  IV   200 mls/hr





  Q24H NICOLE   Administration





  Protocol  


 


Insulin Human Lispro  0 unit  09/03/22 12:00  09/04/22 06:45





  Insulin Lispro 100 Unit/Ml  SUB-Q   Not Given





  Q6HR [facility]  





  Protocol  


 


Lactulose  20 gm  09/03/22 10:00  09/03/22 11:21





  Lactulose 20 Gm/30 Ml Oral Liqd  PO   20 gm





  QDAY NICOLE   Administration


 


Lorazepam  2 mg  09/02/22 16:02 





  Lorazepam 2 Mg/Ml Vial  IV  





  Q1HR PRN  





  CIWA-Ar 8-15  


 


Lorazepam  4 mg  09/02/22 16:02 





  Lorazepam 2 Mg/Ml Vial  IV  





  Q1HR PRN  





  CIWA-Ar 16-25  


 


Magnesium Oxide  400 mg  09/03/22 10:00  09/03/22 11:21





  Magnesium Oxide 400 Mg Tab  PO   400 mg





  DAILY NICOLE   Administration


 


Methylprednisolone Sodium Succinate  40 mg  09/02/22 22:00  09/04/22 06:45





  Methylprednisolone Sod Succinate 40 Mg/1 Ml Inj  IV   40 mg





  Q8HR NICOLE   Administration


 


Multivitamins  1 each  09/03/22 10:00  09/03/22 11:21





  Multivitamins ,Therapeutic Tab  PO   1 each





  QDAY NICOLE   Administration


 


Nortriptyline HCl  75 mg  09/03/22 10:00  09/03/22 11:22





  Nortriptyline 25 Mg Cap  PO   75 mg





  QDAY NICOLE   Administration


 


Oxycodone/Acetaminophen  1 tab  09/02/22 16:00  09/03/22 21:35





  Oxycodone /Acetaminophen 5-325mg Tab  PO   1 tab





  Q16H PRN   Administration





  Pain, Moderate (4-6)  


 


Pantoprazole Sodium  40 mg  09/03/22 10:00  09/03/22 11:21





  Pantoprazole 40 Mg Tab  PO   40 mg





  DAILY NICOLE   Administration


 


Sodium Chloride  10 ml  09/02/22 22:00  09/03/22 21:36





  Sodium Chloride 0.9% 10 Ml Flush Syringe  IV   10 ml





  BID NICOLE   Administration


 


Sodium Chloride  10 ml  09/02/22 15:34 





  Sodium Chloride 0.9% 10 Ml Flush Syringe  IV  09/07/22 15:33 





  PRN PRN  





  LINE FLUSH  


 


Spironolactone  50 mg  09/03/22 20:00  09/03/22 21:34





  Spironolactone 50 Mg Tab  PO   50 mg





  QDAY NICOLE   Administration


 


Thiamine HCl  100 mg  09/03/22 10:00  09/03/22 11:22





  Thiamine 100 Mg Tab  PO   100 mg





  QDAY NICOLE   Administration


 


Zinc Sulfate  220 mg  09/02/22 22:00  09/03/22 21:35





  Zinc Sulfate 220 Mg Cap  PO   220 mg





  BID NICOLE   Administration














Nutrition/Malnutrition Assess





- Dietary Evaluation


Nutrition/Malnutrition Findings: 


                                        





Nutrition Notes                                            Start:  09/03/22 

10:09


Freq:                                                      Status: Active       

 


Protocol:                                                                       

 


 Document     09/03/22 10:09  JENISE  (Rec: 09/03/22 10:35  JENISE  [id number])


 Nutrition Notes


     Need for Assessment generated from:         MD Order


     Initial or Follow up                        Assessment


     Current Diagnosis                           Hypertension


     Other Pertinent Diagnosis                   EtOH Dependence/Cirrhosis/


                                                 Ascites, Hepatic


                                                 Encephalopathy, COVID-19 pui..


                                                 .


     Current Diet                                NPO (since 09/02 15:36).


     Labs/Tests                                  09/03: Na 130, CL 96.0, CO2 19


                                                 , Glu 150.


     Pertinent Medications                       09/03: Vit C, Vit D3, Folic


                                                 acid, Multivitamins, Thiamine,


                                                 ZnSO4, others nutritionally


                                                 unremarkable.


     Height                                      5 ft 5 in


     Weight                                      117.9 kg


     Ideal Body Weight (kg)                      56.81


     BMI                                         43.2


     Intake Prior to Admission                   Good


     Weight change and time frame                Pt denies having loss body


                                                 weight PTA.


     Weight Status                               Morbidly Obese


     Subjective/Other Information                RD consult for Malnutrition


                                                 assessment.


                                                 Pt currently on NPO.


                                                 Pt is on Nasal Cannula. O2


                                                 saturation @ 96%, according to


                                                 Physical Assessment History


                                                 notes.


                                                 Pt presents Bilateral-LE Non-


                                                 Pitting Edema 2+, according to


                                                 Physical Assessment History


                                                 notes.


                                                 Pt complains about Abdominal


                                                 Distention increasing over the


                                                 last 2 weeks, according to


                                                 History & Physical notes.


                                                 Pt shows an unspecified


                                                 redness as sign of concern for


                                                 skin risk at the time,


                                                 according to Physical


                                                 Assessment History notes.


                                                 Pt shows no signs of concern


                                                 for risk of malnutrition at


                                                 the time, according to


                                                 Physical Assessment History


                                                 notes.


     Percent of energy/protein needs met:        Pt currently on NPO.


     Burn                                        Absent


     Trauma                                      Absent


     GI Symptoms                                 Other


     Food Allergy                                No


     Skin Integrity/Comment                      Unspecified redness.


     Current % PO                                Other


     Minimum of two criteria                     No


     Fluid Accumulation                          Mild (non-severe)


     Reduced  Strength                       N/A (non-severe)


     Protein-Calorie Malnutrition                N\A


     #1


      Nutrition Diagnosis                        Overweight/obesity


      Etiology                                   Possibly secondary to


                                                 Lifestyle.


      As Evidenced by Signs and Symptoms         BMI: 43.2 Kg/m2.


     Is patient on ventilator?                   No


     Is Patient Ambulatory and/or Out of Bed     No


     REE-([place]-St. Jeor-confined to bed)      2169.144


     Kcal/Kg value to use for calculation        13


     Approximate Energy Requirements Using       1533


      kcal/Kg                                    


     Calculation Used for Recommendations        Kcal/kg


     Additional Notes                            Protein: 0.6-0.8 g/Kg AdjBW;


                                                 53-70 g/day.


                                                 Fluids: 1 ml/Kcal, or as per


                                                 MD.


 Nutrition Intervention


     Change Diet Order:                          When pertinent, advance to


                                                 Cardiac Diet as tolerated.


     Goal #1                                     Adjust the dietary


                                                 intervention to better serve


                                                 Pt's needs and clinical


                                                 conditions during LOS.


     Follow-Up By:                               09/05/22


     Additional Comments                         When pertinent, start


                                                 monitoring food tolerance, %PO


                                                 intake of meals, and BM.

## 2022-09-05 LAB
ALBUMIN SERPL-MCNC: 2.8 G/DL (ref 3.9–5)
ALT SERPL-CCNC: 38 UNITS/L (ref 7–56)
BUN SERPL-MCNC: 16 MG/DL (ref 7–17)
BUN/CREAT SERPL: 18 %
CALCIUM SERPL-MCNC: 8.2 MG/DL (ref 8.4–10.2)
HCT VFR BLD CALC: 33.6 % (ref 30.3–42.9)
HEMOLYSIS INDEX: 8
HGB BLD-MCNC: 11.1 GM/DL (ref 10.1–14.3)
MCHC RBC AUTO-ENTMCNC: 33 % (ref 30–34)
MCV RBC AUTO: 89 FL (ref 79–97)
PLATELET # BLD: 240 K/MM3 (ref 140–440)
RBC # BLD AUTO: 3.79 M/MM3 (ref 3.65–5.03)

## 2022-09-05 RX ADMIN — INSULIN LISPRO SCH: 100 INJECTION, SOLUTION INTRAVENOUS; SUBCUTANEOUS at 18:57

## 2022-09-05 RX ADMIN — METHYLPREDNISOLONE SODIUM SUCCINATE SCH MG: 40 INJECTION, POWDER, FOR SOLUTION INTRAMUSCULAR; INTRAVENOUS at 06:32

## 2022-09-05 RX ADMIN — LACTULOSE SCH GM: 20 SOLUTION ORAL at 11:14

## 2022-09-05 RX ADMIN — INSULIN LISPRO SCH: 100 INJECTION, SOLUTION INTRAVENOUS; SUBCUTANEOUS at 06:33

## 2022-09-05 RX ADMIN — Medication SCH MG: at 11:14

## 2022-09-05 RX ADMIN — HEPARIN SODIUM SCH UNIT: 5000 INJECTION, SOLUTION INTRAVENOUS; SUBCUTANEOUS at 21:40

## 2022-09-05 RX ADMIN — MULTIVITAMIN TABLET SCH EACH: TABLET at 11:14

## 2022-09-05 RX ADMIN — RIFAXIMIN SCH MG: 550 TABLET ORAL at 11:14

## 2022-09-05 RX ADMIN — Medication SCH ML: at 11:15

## 2022-09-05 RX ADMIN — OXYCODONE AND ACETAMINOPHEN PRN TAB: 5; 325 TABLET ORAL at 21:45

## 2022-09-05 RX ADMIN — CEFTRIAXONE SODIUM SCH MLS/HR: 2 INJECTION, POWDER, FOR SOLUTION INTRAMUSCULAR; INTRAVENOUS at 16:22

## 2022-09-05 RX ADMIN — Medication SCH MG: at 11:15

## 2022-09-05 RX ADMIN — Medication SCH ML: at 21:41

## 2022-09-05 RX ADMIN — INSULIN LISPRO SCH: 100 INJECTION, SOLUTION INTRAVENOUS; SUBCUTANEOUS at 15:38

## 2022-09-05 RX ADMIN — FUROSEMIDE SCH MG: 10 INJECTION, SOLUTION INTRAVENOUS at 11:13

## 2022-09-05 RX ADMIN — AZITHROMYCIN SCH MG: 250 TABLET, FILM COATED ORAL at 11:14

## 2022-09-05 RX ADMIN — PANTOPRAZOLE SODIUM SCH MG: 40 TABLET, DELAYED RELEASE ORAL at 11:14

## 2022-09-05 RX ADMIN — FOLIC ACID SCH MG: 1 TABLET ORAL at 11:14

## 2022-09-05 RX ADMIN — HEPARIN SODIUM SCH UNIT: 5000 INJECTION, SOLUTION INTRAVENOUS; SUBCUTANEOUS at 11:15

## 2022-09-05 RX ADMIN — SPIRONOLACTONE SCH MG: 50 TABLET ORAL at 11:15

## 2022-09-05 RX ADMIN — NORTRIPTYLINE HYDROCHLORIDE SCH MG: 25 CAPSULE ORAL at 11:14

## 2022-09-05 RX ADMIN — INSULIN LISPRO SCH UNIT: 100 INJECTION, SOLUTION INTRAVENOUS; SUBCUTANEOUS at 00:34

## 2022-09-05 RX ADMIN — OXYCODONE HYDROCHLORIDE AND ACETAMINOPHEN SCH MG: 500 TABLET ORAL at 11:14

## 2022-09-05 RX ADMIN — RIFAXIMIN SCH MG: 550 TABLET ORAL at 21:40

## 2022-09-05 NOTE — PROGRESS NOTE
<ROGELIO HORN - Last Filed: 09/05/22 17:57>





Assessment and Plan


Assessment and plan: 


This is a 51-year-old female with known past medical history of ETOH abuse, 

liver cirrhosis s/p biliary stent placement, HTN, nicotine dependence, Guillan 

Sedgwick Syndrome, IBS, and Obesity Hypoventilation Syndrome admitted for liver cir

rhosis with ascites, acute hypoxic respiratory failure, and LLL pneumonia.





Hospital Course to Date:


9/3: Mentation improved, fully AAO this am. Desated in the 80s overnight, now 2L

NC, no respiratory distress noted. Patient remains afebrile and VSS, cultures 

pending, continue empiric IV Abx. Anasarca and +4 pitting edema noted, X1 dose 

of low dose IV Lasix ordered, patient is on IV steroids. Continue PO lactulose 

and PPI. GI consult pending. Patient also reported that she has been bedbound 

since April of this year. D-dimer is elevated, will also check BLE doppler to 

r/o DVT. Continue to trend LFTs. [place]





9/4: imaging study from earlier reviewed, CT scan - abdomen: report reviewed 

(Mildly nodular liver, moderate ascites.  CBD stent in place.)


Patient still with increasing abdominal girth, GI input noted, patient started 

on Lasix and Rifaxmin. [facility] Plan for Paracentesis earliest possible time and rule out SBP. Will 

start on Empiric abx coverage for now. Sodium restrictions. Continue IMCU care 

at this time. Monitor Electrolytes with initiation of Diuretics. 





9/5: Remains on IV Lasix and PO aldactone, with adequate UOP. Still with 

significant pitting edema. Paracentesis pending. D/w GI, continue xifaxan, 

lactulose, and diuretics. Patient needs to follow-up with Dr. Young, 

[facility] in [place], for Biliary stent removal at discharge. 

Advance diet as tolerated. 











Assessment and Plan


#Acute Hypoxic Respiratory Failure


#LLL Pneumonia (CAP)


- Desated in the 80s overnight, now on 2L NC


- CXR suggesting left basilar pneumonia


- COVID PCR negative


- Empiric IV Abx initiated- vero [id number]


- Patient is afebrile, VSS, and cultures with NGTD


- Continue O2 supplementation and wean as tolerated


- Continue SPO2 monitoring for SPO2 goal above 92%


- F/U on cultures


- Daily CBC monitor





#Alcoholic Cirrhosis of Liver with Ascites


#H/o Biliary Stentdue to Bile Leak


- Generalized anasarca and +4 pitting edema noted


- CTabd/pelvis reviewed


- On Xifaxan, Lactulose, IV lasix, and aldactone


- Continue PPI, taper off IV steroids


- GI consulted, appreciate recommendations


- abdominal US & paracentesis pending


- Continue to trend LFTs


- of noted,patient endorse that she has not drink for over a year and a half


- Patient needs to follow-up with Dr. Young, [facility] in 

[place], for Biliary stent removal at discharge.





#Acute Hepatic Encephalopathy


#Hyperammonemia 


#H/o ETOH Abuse


- Presented with AMS, ammonia level mildly elevated at 61


- Most likely related to above


- CT head/brain with no acute intracranial abnormality


- On PO Lactulose, mentation improved, Fully AAO this am


- Patient reported she has not drink any alcohol for a years and a half


- On Thiamine, folic acid, multivitamin daily, and PRN CIWA protocol


- Avoid delirium


- PRN Analgesia for pain control


- Maintenance of sleep-wake cycle





#Hyponatremia


#Fluid Overload


- On diuretics


- Monitor and replace electrolytes as needed


- Trend BMP





#Hyperglycemia


- Per patient no previous history of DM, probably due to IV steroids


- BG check and SSI initiated ACHS


- Avoid hypoglycemia





#Elevated D-Dimer


- COVID PCR pending


- Check BLE doppler to r/o DVT


- Heparin subQ





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


- Chronic, no active treatment at this time.  


- No clinical symptoms present at this time.  Continue supportive measures


- Outpatient neurology follow-up





#Nicotine Dependence


- Current daily 1 pack a day cigarettes smoker for over 20 years


- Smoking cessation education and quitline resources provided


- Patient denied any urges at this time, Nicotine patch as needed





#Obesity Hypoventilation Syndrome


- Balanced diet, increase physical activity discharge


- outpatient pulmonary follow-up for sleep study.  


- Noninvasive positive pressure ventilation as clinically indicated.





#GI/DVT Prophylaxis


- PPI- protonix


- Heparin SubQ





#Advance Care Planning 


- Disease education data, care plan, diagnoses, and prognosis were discussed 

with patient at the bedside. Patient is a FULL code.  Patient acknowledged 

understanding and agreed with current care plan.





 


The high probability of a clinically significant, sudden or life threatening 

deterioration of the [multiple] system(s) required my full and direct attention,

 intervention and personal management. The aggregate critical care time was [60]

 minutes. This time is in addition to time spent performing reported procedures 

but includes the following: 





[x] Data Review and interpretation 





[x] Patient assessment and monitoring of vital signs 





[x] Documentation 





[x] Medication orders and management





Disposition Plan: IMCU


Total Time Spent with Patient (Minutes): 60





History


Interval history: 


Patient seen and examined at the bedside. Remains stable on 2L NC, denied any 

pain nor discomfort at this time. Anasarca and 4+ pitting BLE edema noted, VSS. 

REINALDO overnight.





Hospitalist Physical





- Physical exam


Narrative exam: 


General appearance: Present: no acute distress, well-nourished, obese





- EENT


Eyes: Present: PERRL, EOM intact


ENT: hearing intact





- Neck


Neck: Present: normal ROM





- Respiratory


Respiratory effort: normal


Respiratory: bilateral: diminished





- Cardiovascular


Rhythm: regular


Heart Sounds: Present: S1 & S2





- Extremities


Extremities: no ischemia, pulses intact, pulses symmetrical


Extremity abnormal: edema





- Peripheral Assessment


  ** Bilateral Lower Extremity


Edema Type: Pitting


Edema Degree: 4+


Capillary Refill: < 3 seconds


Skin Temperature: Warm





  ** Generalized


Edema Type: Pitting


Edema Degree: 2+


Capillary Refill: < 3 seconds


Skin Temperature: Warm


Peripheral Pulses: within normal limits





- Abdominal


General gastrointestinal: soft, distended, normal bowel sounds, other (Anasarca)





- Integumentary


Integumentary: Present: warm, dry, erythema





- Psychiatric


Psychiatric: appropriate mood/affect, cooperative





- Neurologic


Neurologic: CNII-XII intact, moves all extremities (Generalized weakness)





- Allied Health


Allied health notes reviewed: nursing, case management





- Constitutional


Vitals: 


                                        











Temp Pulse Resp BP Pulse Ox


 


 98 F   100 H  10 L  137/87   97 


 


 09/05/22 11:52  09/05/22 11:49  09/05/22 10:00  09/05/22 11:15  09/05/22 10:00














HEART Score





- HEART Score


Troponin: 


                                        











Troponin T  < 0.010 ng/mL (0.00-0.029)   09/02/22  Unknown














Results





- Labs


CBC & Chem 7: 


                                 09/05/22 03:58





                                 09/05/22 03:58


Labs: 


                             Laboratory Last Values











WBC  11.5 K/mm3 (4.5-11.0)  H  09/05/22  03:58    


 


RBC  3.79 M/mm3 (3.65-5.03)   09/05/22  03:58    


 


Hgb  11.1 gm/dl (10.1-14.3)   09/05/22  03:58    


 


Hct  33.6 % (30.3-42.9)   09/05/22  03:58    


 


MCV  89 fl (79-97)   09/05/22  03:58    


 


MCH  29 pg (28-32)   09/05/22  03:58    


 


MCHC  33 % (30-34)   09/05/22  03:58    


 


RDW  15.5 % (13.2-15.2)  H  09/05/22  03:58    


 


Plt Count  240 K/mm3 (140-440)   09/05/22  03:58    


 


Lymph % (Auto)  25.3 % (13.4-35.0)   09/02/22  Unknown


 


Mono % (Auto)  9.9 % (0.0-7.3)  H  09/02/22  Unknown


 


Eos % (Auto)  1.2 % (0.0-4.3)   09/02/22  Unknown


 


Baso % (Auto)  0.6 % (0.0-1.8)   09/02/22  Unknown


 


Lymph # (Auto)  2.6 K/mm3 (1.2-5.4)   09/02/22  Unknown


 


Mono # (Auto)  1.0 K/mm3 (0.0-0.8)  H  09/02/22  Unknown


 


Eos # (Auto)  0.1 K/mm3 (0.0-0.4)   09/02/22  Unknown


 


Baso # (Auto)  0.1 K/mm3 (0.0-0.1)   09/02/22  Unknown


 


Add Manual Diff  Complete   09/03/22  04:28    


 


Total Counted  100   09/03/22  04:28    


 


Seg Neutrophils %  Np   09/03/22  04:28    


 


Seg Neuts % (Manual)  87.0 % (40.0-70.0)  H  09/03/22  04:28    


 


Band Neutrophils %  0 %  09/03/22  04:28    


 


Lymphocytes % (Manual)  9.0 % (13.4-35.0)  L  09/03/22  04:28    


 


Reactive Lymphs % (Man)  0 %  09/03/22  04:28    


 


Monocytes % (Manual)  4.0 % (0.0-7.3)   09/03/22  04:28    


 


Eosinophils % (Manual)  0 % (0.0-4.3)   09/03/22  04:28    


 


Basophils % (Manual)  0 % (0.0-1.8)   09/03/22  04:28    


 


Metamyelocytes %  0 %  09/03/22  04:28    


 


Myelocytes %  0 %  09/03/22  04:28    


 


Promyelocytes %  0 %  09/03/22  04:28    


 


Blast Cells %  0 %  09/03/22  04:28    


 


Nucleated RBC %  Not Reportable   09/03/22  04:28    


 


Seg Neutrophils #  6.4 K/mm3 (1.8-7.7)   09/02/22  Unknown


 


Seg Neutrophils # Man  7.8 K/mm3 (1.8-7.7)  H  09/03/22  04:28    


 


Band Neutrophils #  0.0 K/mm3  09/03/22  04:28    


 


Lymphocytes # (Manual)  0.8 K/mm3 (1.2-5.4)  L  09/03/22  04:28    


 


Abs React Lymphs (Man)  0.0 K/mm3  09/03/22  04:28    


 


Monocytes # (Manual)  0.4 K/mm3 (0.0-0.8)   09/03/22  04:28    


 


Eosinophils # (Manual)  0.0 K/mm3 (0.0-0.4)   09/03/22  04:28    


 


Basophils # (Manual)  0.0 K/mm3 (0.0-0.1)   09/03/22  04:28    


 


Metamyelocytes #  0.0 K/mm3  09/03/22  04:28    


 


Myelocytes #  0.0 K/mm3  09/03/22  04:28    


 


Promyelocytes #  0.0 K/mm3  09/03/22  04:28    


 


Blast Cells #  0.0 K/mm3  09/03/22  04:28    


 


WBC Morphology  Not Reportable   09/03/22  04:28    


 


Hypersegmented Neuts  Not Reportable   09/03/22  04:28    


 


Hyposegmented Neuts  Not Reportable   09/03/22  04:28    


 


Hypogranular Neuts  Not Reportable   09/03/22  04:28    


 


Smudge Cells  Not Reportable   09/03/22  04:28    


 


Toxic Granulation  Not Reportable   09/03/22  04:28    


 


Toxic Vacuolation  Not Reportable   09/03/22  04:28    


 


Dohle Bodies  Not Reportable   09/03/22  04:28    


 


Pelger-Huet Anomaly  Not Reportable   09/03/22  04:28    


 


Brown Rods  Not Reportable   09/03/22  04:28    


 


Platelet Estimate  Not Reportable   09/03/22  04:28    


 


Clumped Platelets  Not Reportable   09/03/22  04:28    


 


Plt Clumps, EDTA  Not Reportable   09/03/22  04:28    


 


Large Platelets  Not Reportable   09/03/22  04:28    


 


Giant Platelets  Not Reportable   09/03/22  04:28    


 


Platelet Satelliting  Not Reportable   09/03/22  04:28    


 


Plt Morphology Comment  Not Reportable   09/03/22  04:28    


 


RBC Morphology  Normal   09/03/22  04:28    


 


Dimorphic RBCs  Not Reportable   09/03/22  04:28    


 


Polychromasia  Not Reportable   09/03/22  04:28    


 


Hypochromasia  Not Reportable   09/03/22  04:28    


 


Poikilocytosis  Not Reportable   09/03/22  04:28    


 


Anisocytosis  Not Reportable   09/03/22  04:28    


 


Microcytosis  Not Reportable   09/03/22  04:28    


 


Macrocytosis  Not Reportable   09/03/22  04:28    


 


Spherocytes  Not Reportable   09/03/22  04:28    


 


Pappenheimer Bodies  Not Reportable   09/03/22  04:28    


 


Sickle Cells  Not Reportable   09/03/22  04:28    


 


Target Cells  Not Reportable   09/03/22  04:28    


 


Tear Drop Cells  Not Reportable   09/03/22  04:28    


 


Ovalocytes  Not Reportable   09/03/22  04:28    


 


Helmet Cells  Not Reportable   09/03/22  04:28    


 


Gimenez-Lucas Bodies  Not Reportable   09/03/22  04:28    


 


Cabot Rings  Not Reportable   09/03/22  04:28    


 


Demetra Cells  Not Reportable   09/03/22  04:28    


 


Bite Cells  Not Reportable   09/03/22  04:28    


 


Crenated Cell  Not Reportable   09/03/22  04:28    


 


Elliptocytes  Not Reportable   09/03/22  04:28    


 


Acanthocytes (Spur)  Not Reportable   09/03/22  04:28    


 


Rouleaux  Not Reportable   09/03/22  04:28    


 


Hemoglobin C Crystals  Not Reportable   09/03/22  04:28    


 


Schistocytes  Not Reportable   09/03/22  04:28    


 


Malaria parasites  Not Reportable   09/03/22  04:28    


 


Bonifacio Bodies  Not Reportable   09/03/22  04:28    


 


Hem Pathologist Commnt  No   09/03/22  04:28    


 


PT  16.2 Sec. (12.2-14.9)  H  09/03/22  09:50    


 


INR  1.16  (0.87-1.13)  H  09/03/22  09:50    


 


APTT  37.7 Sec. (24.2-36.6)  H  09/03/22  09:50    


 


D-Dimer  2651.42 ng/mlDDU (0-234)  H  09/02/22  22:33    


 


Sodium  129 mmol/L (137-145)  L  09/05/22  03:58    


 


Potassium  3.9 mmol/L (3.6-5.0)   09/05/22  03:58    


 


Chloride  93.2 mmol/L ()  L  09/05/22  03:58    


 


Carbon Dioxide  24 mmol/L (22-30)   09/05/22  03:58    


 


Anion Gap  16 mmol/L  09/05/22  03:58    


 


BUN  16 mg/dL (7-17)   09/05/22  03:58    


 


Creatinine  0.9 mg/dL (0.6-1.2)   09/05/22  03:58    


 


Estimated GFR  > 60 ml/min  09/05/22  03:58    


 


BUN/Creatinine Ratio  18 %  09/05/22  03:58    


 


Glucose  133 mg/dL ()  H  09/05/22  03:58    


 


POC Glucose  186 mg/dL ()  H  09/03/22  17:11    


 


Calcium  8.2 mg/dL (8.4-10.2)  L  09/05/22  03:58    


 


Phosphorus  4.40 mg/dL (2.5-4.5)   09/04/22  05:10    


 


Magnesium  1.80 mg/dL (1.7-2.3)   09/04/22  05:10    


 


Total Bilirubin  0.60 mg/dL (0.1-1.2)   09/05/22  03:58    


 


AST  75 units/L (5-40)  H  09/05/22  03:58    


 


ALT  38 units/L (7-56)   09/05/22  03:58    


 


Alkaline Phosphatase  282 units/L ()  H  09/05/22  03:58    


 


Ammonia  26.0 umol/L (25-60)   09/04/22  08:28    


 


Lactate Dehydrogenase  144 units/L ()   09/02/22  22:33    


 


Troponin T  < 0.010 ng/mL (0.00-0.029)   09/02/22  Unknown


 


C-Reactive Protein  6.60 mg/dL (0.00-1.30)  H  09/02/22  22:33    


 


Total Protein  6.2 g/dL (6.3-8.2)  L  09/05/22  03:58    


 


Albumin  2.8 g/dL (3.9-5)  L  09/05/22  03:58    


 


Albumin/Globulin Ratio  0.8 %  09/05/22  03:58    


 


Procalcitonin  0.37 ng/mL (<0.15)   09/02/22  22:33    


 


Plasma/Serum Alcohol  < 0.01 % (0-0.07)   09/02/22  15:56    


 


Coronavirus (PCR)  Negative  (Negative)   09/02/22  17:50    











Microbiology: 


Microbiology





09/02/22 22:33   Peripheral/Venous   Blood Culture - Preliminary


                            NO GROWTH AFTER 48 HOURS


09/02/22 22:44   Peripheral/Venous   Blood Culture - Preliminary


                            NO GROWTH AFTER 48 HOURS








Hurt/IV: 


                                        





Voiding Method                   External Female Catheter











Active Medications





- Current Medications


Current Medications: 














Generic Name Dose Route Start Last Admin





  Trade Name Freq  PRN Reason Stop Dose Admin


 


Azithromycin  500 mg  09/03/22 10:00  09/05/22 11:14





  Azithromycin 250 Mg Tab  PO  09/07/22 10:01  500 mg





  QDAY NICOLE   Administration





  Protocol  


 


Dextrose  50 ml  09/03/22 09:09 





  Dextrose 50% In Water (25gm) 50 Ml Syringe  IV  





  Q30MIN PRN  





  Hypoglycemia  





  Protocol  


 


Folic Acid  1 mg  09/03/22 10:00  09/05/22 11:14





  Folic Acid 1 Mg Tab  PO   1 mg





  QDAY NICOLE   Administration


 


Furosemide  40 mg  09/04/22 10:00  09/05/22 11:13





  Furosemide 40 Mg/4 Ml Inj  IV   40 mg





  QDAY NICOLE   Administration


 


Heparin Sodium (Porcine)  5,000 unit  09/02/22 22:00  09/05/22 11:15





  Heparin 5,000 Unit/1 Ml Vial  SUB-Q   5,000 unit





  Q12HR NICOLE   Administration


 


Hydromorphone HCl  0.5 mg  09/02/22 16:00 





  Hydromorphone 0.5 Mg/0.5 Ml Inj  IV  





  Q23H PRN  





  Pain , Severe (7-10)  


 


Ceftriaxone Sodium  2 gm in 100 mls @ 200 mls/hr  09/02/22 16:00  09/04/22 19:22





  Rocephin/Ns 2 Gm/100 Ml  IV  09/06/22 16:29  200 mls/hr





  Q24H NICOLE   Administration





  Protocol  


 


Insulin Human Lispro  0 unit  09/03/22 12:00  09/05/22 06:33





  Insulin Lispro 100 Unit/Ml  SUB-Q   Not Given





  Q6HR NICOLE  





  Protocol  


 


Lactulose  20 gm  09/03/22 10:00  09/05/22 11:14





  Lactulose 20 Gm/30 Ml Oral Liqd  PO   20 gm





  QDAY NICOLE   Administration


 


Lorazepam  2 mg  09/02/22 16:02 





  Lorazepam 2 Mg/Ml Vial  IV  





  Q1HR PRN  





  CIWA-Ar 8-15  


 


Lorazepam  4 mg  09/02/22 16:02 





  Lorazepam 2 Mg/Ml Vial  IV  





  Q1HR PRN  





  CIWA-Ar 16-25  


 


Magnesium Oxide  400 mg  09/03/22 10:00  09/05/22 11:15





  Magnesium Oxide 400 Mg Tab  PO   400 mg





  DAILY NICOLE   Administration


 


Methylprednisolone Sodium Succinate  40 mg  09/02/22 22:00  09/05/22 06:32





  Methylprednisolone Sod Succinate 40 Mg/1 Ml Inj  IV   40 mg





  Q8HR NICOLE   Administration


 


Multivitamins  1 each  09/03/22 10:00  09/05/22 11:14





  Multivitamins ,Therapeutic Tab  PO   1 each





  QDAY NICOLE   Administration


 


Nortriptyline HCl  75 mg  09/03/22 10:00  09/05/22 11:14





  Nortriptyline 25 Mg Cap  PO   75 mg





  QDAY NICOLE   Administration


 


Oxycodone/Acetaminophen  1 tab  09/02/22 16:00  09/04/22 22:19





  Oxycodone /Acetaminophen 5-325mg Tab  PO   1 tab





  Q16H PRN   Administration





  Pain, Moderate (4-6)  


 


Pantoprazole Sodium  40 mg  09/03/22 10:00  09/05/22 11:14





  Pantoprazole 40 Mg Tab  PO   40 mg





  DAILY NICOLE   Administration


 


Rifaximin  550 mg  09/04/22 10:00  09/05/22 11:14





  Rifaximin 550 Mg Tab  PO   550 mg





  BID NICOLE   Administration


 


Sodium Chloride  10 ml  09/02/22 22:00  09/05/22 11:15





  Sodium Chloride 0.9% 10 Ml Flush Syringe  IV   10 ml





  BID NICOLE   Administration


 


Sodium Chloride  10 ml  09/02/22 15:34 





  Sodium Chloride 0.9% 10 Ml Flush Syringe  IV  09/07/22 15:33 





  PRN PRN  





  LINE FLUSH  


 


Spironolactone  50 mg  09/03/22 20:00  09/05/22 11:15





  Spironolactone 50 Mg Tab  PO   50 mg





  QDAY NICOLE   Administration


 


Thiamine HCl  100 mg  09/03/22 10:00  09/05/22 11:14





  Thiamine 100 Mg Tab  PO   100 mg





  QDAY NICOLE   Administration














Nutrition/Malnutrition Assess





- Dietary Evaluation


Nutrition/Malnutrition Findings: 


                                        





Nutrition Notes                                            Start:  09/03/22 

10:09


Freq:                                                      Status: Active       

 


Protocol:                                                                       

 


 Document     09/03/22 10:09  JENISE  (Rec: 09/03/22 10:35  JENISE  [id number])


 Nutrition Notes


     Need for Assessment generated from:         MD Order


     Initial or Follow up                        Assessment


     Current Diagnosis                           Hypertension


     Other Pertinent Diagnosis                   EtOH Dependence/Cirrhosis/


                                                 Ascites, Hepatic


                                                 Encephalopathy, COVID-19 pui..


                                                 .


     Current Diet                                NPO (since 09/02 15:36).


     Labs/Tests                                  09/03: Na 130, CL 96.0, CO2 19


                                                 , Glu 150.


     Pertinent Medications                       09/03: Vit C, Vit D3, Folic


                                                 acid, Multivitamins, Thiamine,


                                                 ZnSO4, others nutritionally


                                                 unremarkable.


     Height                                      5 ft 5 in


     Weight                                      117.9 kg


     Ideal Body Weight (kg)                      56.81


     BMI                                         43.2


     Intake Prior to Admission                   Good


     Weight change and time frame                Pt denies having loss body


                                                 weight PTA.


     Weight Status                               Morbidly Obese


     Subjective/Other Information                RD consult for Malnutrition


                                                 assessment.


                                                 Pt currently on NPO.


                                                 Pt is on Nasal Cannula. O2


                                                 saturation @ 96%, according to


                                                 Physical Assessment History


                                                 notes.


                                                 Pt presents Bilateral-LE Non-


                                                 Pitting Edema 2+, according to


                                                 Physical Assessment History


                                                 notes.


                                                 Pt complains about Abdominal


                                                 Distention increasing over the


                                                 last 2 weeks, according to


                                                 History & Physical notes.


                                                 Pt shows an unspecified


                                                 redness as sign of concern for


                                                 skin risk at the time,


                                                 according to Physical


                                                 Assessment History notes.


                                                 Pt shows no signs of concern


                                                 for risk of malnutrition at


                                                 the time, according to


                                                 Physical Assessment History


                                                 notes.


     Percent of energy/protein needs met:        Pt currently on NPO.


     Burn                                        Absent


     Trauma                                      Absent


     GI Symptoms                                 Other


     Food Allergy                                No


     Skin Integrity/Comment                      Unspecified redness.


     Current % PO                                Other


     Minimum of two criteria                     No


     Fluid Accumulation                          Mild (non-severe)


     Reduced  Strength                       N/A (non-severe)


     Protein-Calorie Malnutrition                N\A


     #1


      Nutrition Diagnosis                        Overweight/obesity


      Etiology                                   Possibly secondary to


                                                 Lifestyle.


      As Evidenced by Signs and Symptoms         BMI: 43.2 Kg/m2.


     Is patient on ventilator?                   No


     Is Patient Ambulatory and/or Out of Bed     No


     REE-(Morgan-St Jeor-confined to bed)      2169.144


     Kcal/Kg value to use for calculation        13


     Approximate Energy Requirements Using       1533


      kcal/Kg                                    


     Calculation Used for Recommendations        Kcal/kg


     Additional Notes                            Protein: 0.6-0.8 g/Kg AdjBW;


                                                 53-70 g/day.


                                                 Fluids: 1 ml/Kcal, or as per


                                                 MD.


 Nutrition Intervention


     Change Diet Order:                          When pertinent, advance to


                                                 Cardiac Diet as tolerated.


     Goal #1                                     Adjust the dietary


                                                 intervention to better serve


                                                 Pt's needs and clinical


                                                 conditions during LOS.


     Follow-Up By:                               09/05/22


     Additional Comments                         When pertinent, start


                                                 monitoring food tolerance, %PO


                                                 intake of meals, and BM.











<JOSE ROBERTO KAM - Last Filed: 09/07/22 07:44>





Assessment and Plan


Assessment and plan: 


I saw and evaluated the patient. I agree with the findings and the plan of care 

as documented in the Nurse Practitioner's~note, with the following corrections 

and additions.











Hospitalist Physical





- Constitutional


Vitals: 


                                        











Temp Pulse Resp BP Pulse Ox


 


 98.2 F   125 H  14   114/71   91 


 


 09/07/22 04:00  09/07/22 06:00  09/07/22 06:00  09/07/22 06:00  09/07/22 06:00














HEART Score





- HEART Score


Troponin: 


                                        











Troponin T  < 0.010 ng/mL (0.00-0.029)   09/02/22  Unknown














Results





- Labs


CBC & Chem 7: 


                                 09/06/22 04:05





                                 09/06/22 04:05


Labs: 


                             Laboratory Last Values











WBC  12.9 K/mm3 (4.5-11.0)  H  09/06/22  04:05    


 


RBC  3.73 M/mm3 (3.65-5.03)   09/06/22  04:05    


 


Hgb  10.7 gm/dl (10.1-14.3)   09/06/22  04:05    


 


Hct  33.2 % (30.3-42.9)   09/06/22  04:05    


 


MCV  89 fl (79-97)   09/06/22  04:05    


 


MCH  29 pg (28-32)   09/06/22  04:05    


 


MCHC  32 % (30-34)   09/06/22  04:05    


 


RDW  15.6 % (13.2-15.2)  H  09/06/22  04:05    


 


Plt Count  244 K/mm3 (140-440)   09/06/22  04:05    


 


Lymph % (Auto)  25.3 % (13.4-35.0)   09/02/22  Unknown


 


Mono % (Auto)  9.9 % (0.0-7.3)  H  09/02/22  Unknown


 


Eos % (Auto)  1.2 % (0.0-4.3)   09/02/22  Unknown


 


Baso % (Auto)  0.6 % (0.0-1.8)   09/02/22  Unknown


 


Lymph # (Auto)  2.6 K/mm3 (1.2-5.4)   09/02/22  Unknown


 


Mono # (Auto)  1.0 K/mm3 (0.0-0.8)  H  09/02/22  Unknown


 


Eos # (Auto)  0.1 K/mm3 (0.0-0.4)   09/02/22  Unknown


 


Baso # (Auto)  0.1 K/mm3 (0.0-0.1)   09/02/22  Unknown


 


Add Manual Diff  Complete   09/03/22  04:28    


 


Total Counted  100   09/03/22  04:28    


 


Seg Neutrophils %  Np   09/03/22  04:28    


 


Seg Neuts % (Manual)  87.0 % (40.0-70.0)  H  09/03/22  04:28    


 


Band Neutrophils %  0 %  09/03/22  04:28    


 


Lymphocytes % (Manual)  9.0 % (13.4-35.0)  L  09/03/22  04:28    


 


Reactive Lymphs % (Man)  0 %  09/03/22  04:28    


 


Monocytes % (Manual)  4.0 % (0.0-7.3)   09/03/22  04:28    


 


Eosinophils % (Manual)  0 % (0.0-4.3)   09/03/22  04:28    


 


Basophils % (Manual)  0 % (0.0-1.8)   09/03/22  04:28    


 


Metamyelocytes %  0 %  09/03/22  04:28    


 


Myelocytes %  0 %  09/03/22  04:28    


 


Promyelocytes %  0 %  09/03/22  04:28    


 


Blast Cells %  0 %  09/03/22  04:28    


 


Nucleated RBC %  Not Reportable   09/03/22  04:28    


 


Seg Neutrophils #  6.4 K/mm3 (1.8-7.7)   09/02/22  Unknown


 


Seg Neutrophils # Man  7.8 K/mm3 (1.8-7.7)  H  09/03/22  04:28    


 


Band Neutrophils #  0.0 K/mm3  09/03/22  04:28    


 


Lymphocytes # (Manual)  0.8 K/mm3 (1.2-5.4)  L  09/03/22  04:28    


 


Abs React Lymphs (Man)  0.0 K/mm3  09/03/22  04:28    


 


Monocytes # (Manual)  0.4 K/mm3 (0.0-0.8)   09/03/22  04:28    


 


Eosinophils # (Manual)  0.0 K/mm3 (0.0-0.4)   09/03/22  04:28    


 


Basophils # (Manual)  0.0 K/mm3 (0.0-0.1)   09/03/22  04:28    


 


Metamyelocytes #  0.0 K/mm3  09/03/22  04:28    


 


Myelocytes #  0.0 K/mm3  09/03/22  04:28    


 


Promyelocytes #  0.0 K/mm3  09/03/22  04:28    


 


Blast Cells #  0.0 K/mm3  09/03/22  04:28    


 


WBC Morphology  Not Reportable   09/03/22  04:28    


 


Hypersegmented Neuts  Not Reportable   09/03/22  04:28    


 


Hyposegmented Neuts  Not Reportable   09/03/22  04:28    


 


Hypogranular Neuts  Not Reportable   09/03/22  04:28    


 


Smudge Cells  Not Reportable   09/03/22  04:28    


 


Toxic Granulation  Not Reportable   09/03/22  04:28    


 


Toxic Vacuolation  Not Reportable   09/03/22  04:28    


 


Dohle Bodies  Not Reportable   09/03/22  04:28    


 


Pelger-Huet Anomaly  Not Reportable   09/03/22  04:28    


 


Brown Rods  Not Reportable   09/03/22  04:28    


 


Platelet Estimate  Not Reportable   09/03/22  04:28    


 


Clumped Platelets  Not Reportable   09/03/22  04:28    


 


Plt Clumps, EDTA  Not Reportable   09/03/22  04:28    


 


Large Platelets  Not Reportable   09/03/22  04:28    


 


Giant Platelets  Not Reportable   09/03/22  04:28    


 


Platelet Satelliting  Not Reportable   09/03/22  04:28    


 


Plt Morphology Comment  Not Reportable   09/03/22  04:28    


 


RBC Morphology  Normal   09/03/22  04:28    


 


Dimorphic RBCs  Not Reportable   09/03/22  04:28    


 


Polychromasia  Not Reportable   09/03/22  04:28    


 


Hypochromasia  Not Reportable   09/03/22  04:28    


 


Poikilocytosis  Not Reportable   09/03/22  04:28    


 


Anisocytosis  Not Reportable   09/03/22  04:28    


 


Microcytosis  Not Reportable   09/03/22  04:28    


 


Macrocytosis  Not Reportable   09/03/22  04:28    


 


Spherocytes  Not Reportable   09/03/22  04:28    


 


Pappenheimer Bodies  Not Reportable   09/03/22  04:28    


 


Sickle Cells  Not Reportable   09/03/22  04:28    


 


Target Cells  Not Reportable   09/03/22  04:28    


 


Tear Drop Cells  Not Reportable   09/03/22  04:28    


 


Ovalocytes  Not Reportable   09/03/22  04:28    


 


Helmet Cells  Not Reportable   09/03/22  04:28    


 


Gimenez-Lucas Bodies  Not Reportable   09/03/22  04:28    


 


Cabot Rings  Not Reportable   09/03/22  04:28    


 


Demetra Cells  Not Reportable   09/03/22  04:28    


 


Bite Cells  Not Reportable   09/03/22  04:28    


 


Crenated Cell  Not Reportable   09/03/22  04:28    


 


Elliptocytes  Not Reportable   09/03/22  04:28    


 


Acanthocytes (Spur)  Not Reportable   09/03/22  04:28    


 


Rouleaux  Not Reportable   09/03/22  04:28    


 


Hemoglobin C Crystals  Not Reportable   09/03/22  04:28    


 


Schistocytes  Not Reportable   09/03/22  04:28    


 


Malaria parasites  Not Reportable   09/03/22  04:28    


 


Bonifacio Bodies  Not Reportable   09/03/22  04:28    


 


Hem Pathologist Commnt  No   09/03/22  04:28    


 


PT  16.5 Sec. (12.2-14.9)  H  09/06/22  04:05    


 


INR  1.19  (0.87-1.13)  H  09/06/22  04:05    


 


APTT  38.3 Sec. (24.2-36.6)  H  09/06/22  04:05    


 


D-Dimer  2651.42 ng/mlDDU (0-234)  H  09/02/22  22:33    


 


Sodium  134 mmol/L (137-145)  L  09/06/22  04:05    


 


Potassium  3.8 mmol/L (3.6-5.0)   09/06/22  04:05    


 


Chloride  98.0 mmol/L ()   09/06/22  04:05    


 


Carbon Dioxide  23 mmol/L (22-30)   09/06/22  04:05    


 


Anion Gap  17 mmol/L  09/06/22  04:05    


 


BUN  17 mg/dL (7-17)   09/06/22  04:05    


 


Creatinine  1.0 mg/dL (0.6-1.2)   09/06/22  04:05    


 


Estimated GFR  59 ml/min  09/06/22  04:05    


 


BUN/Creatinine Ratio  17 %  09/06/22  04:05    


 


Glucose  156 mg/dL ()  H  09/06/22  04:05    


 


POC Glucose  186 mg/dL ()  H  09/03/22  17:11    


 


Calcium  8.1 mg/dL (8.4-10.2)  L  09/06/22  04:05    


 


Phosphorus  3.40 mg/dL (2.5-4.5)   09/06/22  04:05    


 


Magnesium  1.60 mg/dL (1.7-2.3)  L  09/06/22  04:05    


 


Total Bilirubin  0.50 mg/dL (0.1-1.2)   09/06/22  04:05    


 


AST  84 units/L (5-40)  H  09/06/22  04:05    


 


ALT  43 units/L (7-56)   09/06/22  04:05    


 


Alkaline Phosphatase  300 units/L ()  H  09/06/22  04:05    


 


Ammonia  26.0 umol/L (25-60)   09/04/22  08:28    


 


Lactate Dehydrogenase  144 units/L ()   09/02/22  22:33    


 


Troponin T  < 0.010 ng/mL (0.00-0.029)   09/02/22  Unknown


 


C-Reactive Protein  6.60 mg/dL (0.00-1.30)  H  09/02/22  22:33    


 


Total Protein  5.8 g/dL (6.3-8.2)  L  09/06/22  04:05    


 


Albumin  2.5 g/dL (3.9-5)  L  09/06/22  04:05    


 


Albumin/Globulin Ratio  0.8 %  09/06/22  04:05    


 


Procalcitonin  0.37 ng/mL (<0.15)   09/02/22  22:33    


 


Fluid Type  Ascitic   09/06/22  Unknown


 


Fluid Color  Straw   09/06/22  Unknown


 


Fluid Appearance  Hazy   09/06/22  Unknown


 


Fluid WBC  51 /mm3  09/06/22  Unknown


 


Fluid RBC  21 /mm3  09/06/22  Unknown


 


Fluid Seg Neutrophils  23.0 %  09/06/22  Unknown


 


Fluid Lymphocytes  63.0 %  09/06/22  Unknown


 


Fluid Monocytes  14.0 %  09/06/22  Unknown


 


Plasma/Serum Alcohol  < 0.01 % (0-0.07)   09/02/22  15:56    


 


Coronavirus (PCR)  Negative  (Negative)   09/02/22  17:50    











Microbiology: 


Microbiology





09/06/22 Unknown   Ascities Fluid   Body Fluid Culture - Preliminary


09/02/22 22:33   Peripheral/Venous   Blood Culture - Preliminary


                            NO GROWTH AFTER 4 DAYS


09/02/22 22:44   Peripheral/Venous   Blood Culture - Preliminary


                            NO GROWTH AFTER 4 DAYS








Hurt/IV: 


                                        





Voiding Method                   External Female Catheter











Active Medications





- Current Medications


Current Medications: 














Generic Name Dose Route Start Last Admin





  Trade Name Freq  PRN Reason Stop Dose Admin


 


Azithromycin  500 mg  09/03/22 10:00  09/06/22 10:20





  Azithromycin 250 Mg Tab  PO  09/07/22 10:01  500 mg





  QDAY NICOLE   Administration





  Protocol  


 


Dextrose  50 ml  09/03/22 09:09 





  Dextrose 50% In Water (25gm) 50 Ml Syringe  IV  





  Q30MIN PRN  





  Hypoglycemia  





  Protocol  


 


Folic Acid  1 mg  09/03/22 10:00  09/06/22 10:13





  Folic Acid 1 Mg Tab  PO   1 mg





  QDAY NCIOLE   Administration


 


Furosemide  20 mg  09/07/22 10:00 





  Furosemide 20 Mg Tab  PO  





  QDAY NICOLE  


 


Heparin Sodium (Porcine)  5,000 unit  09/02/22 22:00  09/06/22 20:59





  Heparin 5,000 Unit/1 Ml Vial  SUB-Q   5,000 unit





  Q12HR NICOLE   Administration


 


Hydromorphone HCl  0.5 mg  09/02/22 16:00 





  Hydromorphone 0.5 Mg/0.5 Ml Inj  IV  





  Q23H PRN  





  Pain , Severe (7-10)  


 


Insulin Human Lispro  0 unit  09/03/22 12:00  09/07/22 05:56





  Insulin Lispro 100 Unit/Ml  SUB-Q   Not Given





  Q6HR [facility]  





  Protocol  


 


Lactulose  20 gm  09/03/22 10:00  09/06/22 10:13





  Lactulose 20 Gm/30 Ml Oral Liqd  PO   20 gm





  QDAY NICOLE   Administration


 


Magnesium Oxide  400 mg  09/03/22 10:00  09/06/22 10:14





  Magnesium Oxide 400 Mg Tab  PO   400 mg





  DAILY NICOLE   Administration


 


Methylprednisolone Sodium Succinate  40 mg  09/06/22 10:00  09/06/22 10:14





  Methylprednisolone Sod Succinate 40 Mg/1 Ml Inj  IV  09/07/22 09:59  40 mg





  Q24HR NICOLE   Administration


 


Multivitamins  1 each  09/03/22 10:00  09/06/22 10:15





  Multivitamins ,Therapeutic Tab  PO   1 each





  QDAY NICOLE   Administration


 


Nortriptyline HCl  75 mg  09/03/22 10:00  09/06/22 13:09





  Nortriptyline 25 Mg Cap  PO   Not Given





  QDAY NICOLE  


 


Oxycodone/Acetaminophen  1 tab  09/02/22 16:00  09/05/22 21:45





  Oxycodone /Acetaminophen 5-325mg Tab  PO   1 tab





  Q16H PRN   Administration





  Pain, Moderate (4-6)  


 


Pantoprazole Sodium  40 mg  09/03/22 10:00  09/06/22 10:14





  Pantoprazole 40 Mg Tab  PO   40 mg





  DAILY NICOLE   Administration


 


Rifaximin  550 mg  09/04/22 10:00  09/06/22 20:59





  Rifaximin 550 Mg Tab  PO   550 mg





  BID NICOLE   Administration


 


Sodium Chloride  10 ml  09/02/22 22:00  09/06/22 20:59





  Sodium Chloride 0.9% 10 Ml Flush Syringe  IV   10 ml





  BID NICOLE   Administration


 


Sodium Chloride  10 ml  09/02/22 15:34 





  Sodium Chloride 0.9% 10 Ml Flush Syringe  IV  09/07/22 15:33 





  PRN PRN  





  LINE FLUSH  


 


Spironolactone  50 mg  09/03/22 20:00  09/06/22 10:12





  Spironolactone 50 Mg Tab  PO   50 mg





  QDAY NICOLE   Administration


 


Thiamine HCl  100 mg  09/03/22 10:00  09/06/22 10:20





  Thiamine 100 Mg Tab  PO   100 mg





  QDAY NICOLE   Administration














Nutrition/Malnutrition Assess





- Dietary Evaluation


Nutrition/Malnutrition Findings: 


                                        





Nutrition Notes                                            Start:  09/03/22 

10:09


Freq:                                                      Status: Active       

 


Protocol:                                                                       

 


 Document     09/05/22 12:43  JENISE  (Rec: 09/05/22 13:01  JENISE  [id number])


 Nutrition Notes


     Initial or Follow up                        Brief Note


     Current Diagnosis                           Hypertension


     Other Pertinent Diagnosis                   EtOH Dependence/Cirrhosis/


                                                 Ascites, Hepatic


                                                 Encephalopathy, ...


     Current Diet                                Clear Liquids -Low Sodium-


                                                 Diet (since D 09/03).


     Height                                      5 ft 5 in


     Weight                                      117.9 kg


     Ideal Body Weight (kg)                      56.81


     BMI                                         43.2


     Weight change and time frame                No body weight change reported


                                                 in 2 days.


     Weight Status                               Morbidly Obese


     Subjective/Other Information                RD consult for routine F/U on


                                                 dietary advancement.


                                                 Diet advanced to PO, No


                                                 reports available on Pt's PO


                                                 intake of meals at the time,


                                                 will assess at F/U.


                                                 Pt is on Nasal Cannula. O2


                                                 saturation @ 98%, according to


                                                 Physical Assessment History


                                                 notes.


                                                 Pt presents Bilateral-LE


                                                 Pitting Edema 4+, according to


                                                 Physical Assessment History


                                                 notes.


                                                 Pt continues presenting


                                                 Ascites, according to Progress


                                                 notes.


     Percent of energy/protein needs met:        Prescribed Clear Liquids -Low


                                                 Sodium- Diet provides for


                                                 energy/protein needs (590 Kcal


                                                 /16 g) during LOS


     Fluid Accumulation                          Moderate to Severe (severe)


     #1


      Nutrition Diagnosis                        Overweight/obesity


      Diagnosis Progress(for reassessment        Continues


       documentation)                            


     Is patient on ventilator?                   No


     Is Patient Ambulatory and/or Out of Bed     No


     REE-([facility]-confined to bed)      2169.144


     Kcal/Kg value to use for calculation        13


     Approximate Energy Requirements Using       1533


      kcal/Kg                                    


     Calculation Used for Recommendations        Kcal/kg


     Additional Notes                            Protein: 0.6-0.8 g/Kg AdjBW;


                                                 53-70 g/day.


                                                 Fluids: 1 ml/Kcal, or as per


                                                 MD.


 Nutrition Intervention


     Change Diet Order:                          Continue Clear Liquids -Low


                                                 Sodium- Diet as tolerated.


     Goal #1                                     Adjust the dietary


                                                 intervention to better serve


                                                 Pt's needs and clinical


                                                 conditions during LOS.


     Follow-Up By:                               09/12/22


     Additional Comments                         Continue monitoring food


                                                 tolerance, %PO intake of meals


                                                 , and BM.

## 2022-09-05 NOTE — PROGRESS NOTE
Assessment and Plan


1.  Abdominal distentionthis has improved significantly.  Secondary to ascites.

 Patient has volume overload with pedal edema as well.  


 Large-volume paracentesis, and check for SBPordered and awaiting completion


 Sodium restriction


Continue diuretics with spironolactone and furosemide, and would discharge to 

home on this.


Monitor renal function





2.  Cirrhosisrecords reviewed.  Liver biopsy done during time of 

cholecystectomy on April 28, 2022, shows cirrhosis.  Likely due to alcohol and 

possibly due to fatty liver disease.


Continue follow-up in [place] with [facility].





3.  Confusionresolved.   relates what sounds like a history of 

encephalopathy, and patient was on lactulose before.  This is not well-tolerated

given her IBS and diarrhea.  Ammonia level normal now at 26.


Continue Xifaxan





4.  Biliary stentdue to bile leak.  Patient underwent ERCP at [facility] 

on June 1 of this year with Dr. Young, and had a fully coated metal stent 

placed.  Patient was to have gone back for stent removal, but 's office was 

apparently not able to contact her.


Needs to follow-up with [facility] in [place], as there [facility] 

and [facility] had done the original ERCP.  She will need to get stent 

removed by them.  This was communicated to the patient as well as to her [facility]

nd, whom I contacted by phone.  Names of physicians and practice left for them.





5.  Left basilar pneumoniaper hospitalist.





No further GI recommendations.  We will sign off.








Subjective


Date of service: 09/05/22


Interval history: 


Patient in bed and doing well.  No complaints.  More alert and answering 

questions appropriately.








Objective





- Constitutional


Vitals: 


                               Vital Signs - 12hr











  09/05/22 09/05/22 09/05/22





  00:20 01:00 02:00


 


Temperature   


 


Pulse Rate 105 H 106 H 107 H


 


Pulse Rate [   107 H





From Monitor]   


 


Respiratory  10 L 11 L





Rate   


 


Blood Pressure  129/85 138/84


 


O2 Sat by Pulse  98 97





Oximetry   














  09/05/22 09/05/22 09/05/22





  03:00 04:00 04:01


 


Temperature  97.4 F L 


 


Pulse Rate 106 H  103 H


 


Pulse Rate [   





From Monitor]   


 


Respiratory 10 L  11 L





Rate   


 


Blood Pressure 135/87  134/95


 


O2 Sat by Pulse 97  95





Oximetry   














  09/05/22 09/05/22 09/05/22





  04:25 05:00 06:00


 


Temperature   


 


Pulse Rate 102 H 103 H 105 H


 


Pulse Rate [   





From Monitor]   


 


Respiratory  11 L 11 L





Rate   


 


Blood Pressure  138/81 135/84


 


O2 Sat by Pulse  97 97





Oximetry   














  09/05/22 09/05/22 09/05/22





  07:00 07:05 08:00


 


Temperature  98.6 F 


 


Pulse Rate 104 H  103 H


 


Pulse Rate [   100 H





From Monitor]   


 


Respiratory 11 L  9 L





Rate   


 


Blood Pressure 131/79  117/78


 


O2 Sat by Pulse 99  99





Oximetry   














  09/05/22 09/05/22 09/05/22





  09:01 10:00 11:15


 


Temperature   


 


Pulse Rate 107 H 108 H 107 H


 


Pulse Rate [   





From Monitor]   


 


Respiratory 13 10 L 





Rate   


 


Blood Pressure 133/85 141/85 137/87


 


O2 Sat by Pulse 97 97 





Oximetry   














  09/05/22 09/05/22





  11:49 11:52


 


Temperature  98 F


 


Pulse Rate 100 H 


 


Pulse Rate [  





From Monitor]  


 


Respiratory  





Rate  


 


Blood Pressure  


 


O2 Sat by Pulse  





Oximetry  











General appearance: Present: no acute distress





- EENT


Eyes: PERRL, EOM intact


ENT: hearing intact





- Respiratory


Respiratory effort: normal





- Gastrointestinal


General gastrointestinal: Present: soft (Abdomen is softer than on admission.), 

non-tender, normal bowel sounds





- Labs


CBC & Chem 7: 


                                 09/05/22 03:58





                                 09/05/22 03:58


Labs: 


                              Abnormal lab results











  09/05/22 09/05/22 Range/Units





  03:58 03:58 


 


WBC  11.5 H   (4.5-11.0)  K/mm3


 


RDW  15.5 H   (13.2-15.2)  %


 


Sodium   129 L  (137-145)  mmol/L


 


Chloride   93.2 L  ()  mmol/L


 


Glucose   133 H  ()  mg/dL


 


Calcium   8.2 L  (8.4-10.2)  mg/dL


 


AST   75 H  (5-40)  units/L


 


Alkaline Phosphatase   282 H  ()  units/L


 


Total Protein   6.2 L  (6.3-8.2)  g/dL


 


Albumin   2.8 L  (3.9-5)  g/dL














Medications & Allergies





- Medications


Allergies/Adverse Reactions: 


                                    Allergies





codeine Allergy (Verified 09/02/22 10:11)


   Anaphylaxis








Home Medications: 


                                Home Medications











 Medication  Instructions  Recorded  Confirmed  Last Taken  Type


 


Benzonatate [Tessalon Perles] 100 mg PO Q8HR #20 capsule 04/08/20 02/14/22 

Unknown Rx


 


Magnesium Oxide 400 mg PO DAILY 02/15/22 02/15/22 02/13/22 09:00 History


 


Metoprolol Xl [Metoprolol 50 mg PO QDAY 02/15/22 02/15/22 02/13/22 09:00 History





SUCCINATE ER TAB]     


 


Nortriptyline HCl [Pamelor] 75 mg PO DAILY 02/15/22 02/15/22 02/13/22 09:00 

History


 


Omeprazole 40 mg PO DAILY 02/15/22 02/15/22 02/13/22 09:00 History


 


Valsartan [Diovan] 40 mg PO DAILY 02/15/22 02/15/22 02/13/22 09:00 History


 


dilTIAZem HCl [Diltiazem 24Hr  mg PO QDAY 02/15/22 02/15/22 02/13/22 09:00

 History





(Cd)]     


 


Amoxicillin/Potassium Clav 1 each PO BID 3 Days #6 tab 02/16/22  Unknown Rx





[Augmentin 875-125 Tablet]     


 


Azithromycin 500 mg PO DAILY 2 Days #2 tab 02/16/22  Unknown Rx











Active Medications: 














Generic Name Dose Route Start Last Admin





  Trade Name Freq  PRN Reason Stop Dose Admin


 


Azithromycin  500 mg  09/03/22 10:00  09/05/22 11:14





  Azithromycin 250 Mg Tab  PO  09/07/22 10:01  500 mg





  QDAY NICOLE   Administration





  Protocol  


 


Dextrose  50 ml  09/03/22 09:09 





  Dextrose 50% In Water (25gm) 50 Ml Syringe  IV  





  Q30MIN PRN  





  Hypoglycemia  





  Protocol  


 


Folic Acid  1 mg  09/03/22 10:00  09/05/22 11:14





  Folic Acid 1 Mg Tab  PO   1 mg





  QDAY NICOLE   Administration


 


Furosemide  40 mg  09/04/22 10:00  09/05/22 11:13





  Furosemide 40 Mg/4 Ml Inj  IV  09/06/22 11:00  40 mg





  QDAY NICOLE   Administration


 


Furosemide  20 mg  09/07/22 10:00 





  Furosemide 20 Mg Tab  PO  





  QDAY NICOLE  


 


Heparin Sodium (Porcine)  5,000 unit  09/02/22 22:00  09/05/22 11:15





  Heparin 5,000 Unit/1 Ml Vial  SUB-Q   5,000 unit





  Q12HR NICOLE   Administration


 


Hydromorphone HCl  0.5 mg  09/02/22 16:00 





  Hydromorphone 0.5 Mg/0.5 Ml Inj  IV  





  Q23H PRN  





  Pain , Severe (7-10)  


 


Ceftriaxone Sodium  2 gm in 100 mls @ 200 mls/hr  09/02/22 16:00  09/04/22 19:22





  Rocephin/Ns 2 Gm/100 Ml  IV  09/06/22 16:29  200 mls/hr





  Q24H NICOLE   Administration





  Protocol  


 


Insulin Human Lispro  0 unit  09/03/22 12:00  09/05/22 06:33





  Insulin Lispro 100 Unit/Ml  SUB-Q   Not Given





  Q6HR NICOLE  





  Protocol  


 


Lactulose  20 gm  09/03/22 10:00  09/05/22 11:14





  Lactulose 20 Gm/30 Ml Oral Liqd  PO   20 gm





  QDAY NICOLE   Administration


 


Magnesium Oxide  400 mg  09/03/22 10:00  09/05/22 11:15





  Magnesium Oxide 400 Mg Tab  PO   400 mg





  DAILY NICOLE   Administration


 


Methylprednisolone Sodium Succinate  40 mg  09/02/22 22:00  09/05/22 06:32





  Methylprednisolone Sod Succinate 40 Mg/1 Ml Inj  IV   40 mg





  Q8HR NICOLE   Administration


 


Multivitamins  1 each  09/03/22 10:00  09/05/22 11:14





  Multivitamins ,Therapeutic Tab  PO   1 each





  QDAY NICOLE   Administration


 


Nortriptyline HCl  75 mg  09/03/22 10:00  09/05/22 11:14





  Nortriptyline 25 Mg Cap  PO   75 mg





  QDAY NICOLE   Administration


 


Oxycodone/Acetaminophen  1 tab  09/02/22 16:00  09/04/22 22:19





  Oxycodone /Acetaminophen 5-325mg Tab  PO   1 tab





  Q16H PRN   Administration





  Pain, Moderate (4-6)  


 


Pantoprazole Sodium  40 mg  09/03/22 10:00  09/05/22 11:14





  Pantoprazole 40 Mg Tab  PO   40 mg





  DAILY NICOLE   Administration


 


Rifaximin  550 mg  09/04/22 10:00  09/05/22 11:14





  Rifaximin 550 Mg Tab  PO   550 mg





  BID NICOLE   Administration


 


Sodium Chloride  10 ml  09/02/22 22:00  09/05/22 11:15





  Sodium Chloride 0.9% 10 Ml Flush Syringe  IV   10 ml





  BID NICOLE   Administration


 


Sodium Chloride  10 ml  09/02/22 15:34 





  Sodium Chloride 0.9% 10 Ml Flush Syringe  IV  09/07/22 15:33 





  PRN PRN  





  LINE FLUSH  


 


Spironolactone  50 mg  09/03/22 20:00  09/05/22 11:15





  Spironolactone 50 Mg Tab  PO   50 mg





  QDAY NICOLE   Administration


 


Thiamine HCl  100 mg  09/03/22 10:00  09/05/22 11:14





  Thiamine 100 Mg Tab  PO   100 mg





  QDAY NICOLE   Administration














HEART Score





- HEART Score


Troponin: 


                                        











Troponin T  < 0.010 ng/mL (0.00-0.029)   09/02/22  Unknown

## 2022-09-06 LAB
ALBUMIN SERPL-MCNC: 2.5 G/DL (ref 3.9–5)
ALT SERPL-CCNC: 43 UNITS/L (ref 7–56)
APTT BLD: 38.3 SEC. (ref 24.2–36.6)
BUN SERPL-MCNC: 17 MG/DL (ref 7–17)
BUN/CREAT SERPL: 17 %
CALCIUM SERPL-MCNC: 8.1 MG/DL (ref 8.4–10.2)
HCT VFR BLD CALC: 33.2 % (ref 30.3–42.9)
HEMOLYSIS INDEX: 4
HGB BLD-MCNC: 10.7 GM/DL (ref 10.1–14.3)
INR PPP: 1.19 (ref 0.87–1.13)
MCHC RBC AUTO-ENTMCNC: 32 % (ref 30–34)
MCV RBC AUTO: 89 FL (ref 79–97)
MONOCYTES # FLD: 14 %
PLATELET # BLD: 244 K/MM3 (ref 140–440)
RBC # BLD AUTO: 3.73 M/MM3 (ref 3.65–5.03)
TOTAL CELLS COUNTED: 100 /MM3

## 2022-09-06 PROCEDURE — 0W9G3ZZ DRAINAGE OF PERITONEAL CAVITY, PERCUTANEOUS APPROACH: ICD-10-PCS

## 2022-09-06 RX ADMIN — SPIRONOLACTONE SCH MG: 50 TABLET ORAL at 10:12

## 2022-09-06 RX ADMIN — MULTIVITAMIN TABLET SCH EACH: TABLET at 10:15

## 2022-09-06 RX ADMIN — RIFAXIMIN SCH MG: 550 TABLET ORAL at 10:20

## 2022-09-06 RX ADMIN — RIFAXIMIN SCH MG: 550 TABLET ORAL at 20:59

## 2022-09-06 RX ADMIN — CEFTRIAXONE SODIUM SCH MLS/HR: 2 INJECTION, POWDER, FOR SOLUTION INTRAMUSCULAR; INTRAVENOUS at 17:23

## 2022-09-06 RX ADMIN — INSULIN LISPRO SCH: 100 INJECTION, SOLUTION INTRAVENOUS; SUBCUTANEOUS at 10:11

## 2022-09-06 RX ADMIN — Medication SCH MG: at 10:20

## 2022-09-06 RX ADMIN — INSULIN LISPRO SCH UNIT: 100 INJECTION, SOLUTION INTRAVENOUS; SUBCUTANEOUS at 17:23

## 2022-09-06 RX ADMIN — HEPARIN SODIUM SCH UNIT: 5000 INJECTION, SOLUTION INTRAVENOUS; SUBCUTANEOUS at 10:13

## 2022-09-06 RX ADMIN — NORTRIPTYLINE HYDROCHLORIDE SCH MG: 25 CAPSULE ORAL at 12:52

## 2022-09-06 RX ADMIN — LACTULOSE SCH GM: 20 SOLUTION ORAL at 10:13

## 2022-09-06 RX ADMIN — INSULIN LISPRO SCH: 100 INJECTION, SOLUTION INTRAVENOUS; SUBCUTANEOUS at 13:54

## 2022-09-06 RX ADMIN — NORTRIPTYLINE HYDROCHLORIDE SCH: 25 CAPSULE ORAL at 13:09

## 2022-09-06 RX ADMIN — Medication SCH MG: at 10:14

## 2022-09-06 RX ADMIN — Medication SCH ML: at 20:59

## 2022-09-06 RX ADMIN — INSULIN LISPRO SCH: 100 INJECTION, SOLUTION INTRAVENOUS; SUBCUTANEOUS at 00:31

## 2022-09-06 RX ADMIN — PANTOPRAZOLE SODIUM SCH MG: 40 TABLET, DELAYED RELEASE ORAL at 10:14

## 2022-09-06 RX ADMIN — FOLIC ACID SCH MG: 1 TABLET ORAL at 10:13

## 2022-09-06 RX ADMIN — FUROSEMIDE SCH MG: 10 INJECTION, SOLUTION INTRAVENOUS at 10:13

## 2022-09-06 RX ADMIN — Medication SCH ML: at 10:14

## 2022-09-06 RX ADMIN — AZITHROMYCIN SCH MG: 250 TABLET, FILM COATED ORAL at 10:20

## 2022-09-06 RX ADMIN — HEPARIN SODIUM SCH UNIT: 5000 INJECTION, SOLUTION INTRAVENOUS; SUBCUTANEOUS at 20:59

## 2022-09-06 NOTE — VASCULAR LAB REPORT
DUPLEX DOPPLER LOWER EXTREMITY VEINS, BILATERAL



INDICATION / CLINICAL INFORMATION: Swelling, R/o DVT.



TECHNIQUE: Duplex doppler imaging was performed through the veins of both lower extremities using guillermo
ous compression and other maneuvers.



COMPARISON: Bilateral lower extremity venous Doppler 2/14/2022.



FINDINGS:

RIGHT COMMON FEMORAL VEIN: Negative.

RIGHT FEMORAL VEIN: Negative.

RIGHT POPLITEAL VEIN: Negative.

RIGHT CALF VEINS: Negative.



LEFT COMMON FEMORAL VEIN: Negative.

LEFT FEMORAL VEIN: Negative.

LEFT POPLITEAL VEIN: Negative.

LEFT CALF VEINS: Negative.



ADDITIONAL FINDINGS: None.



IMPRESSION:

1. Technically limited exam secondary to significant bilateral lower extremity edema.

2. No sonographic evidence for DVT in either lower extremity.



Scribed by: Edwige Guzman RDMS, KWAMET, ROHAN 

Scribed: 9/6/2022 10:53 AM 



 I have reviewed the images, agree with this report, and edited this report as needed.



Signer Name: Jimmy Arevalo MD 

Signed: 9/6/2022 2:16 PM

Workstation Name: [id number]

## 2022-09-06 NOTE — ULTRASOUND REPORT
ULTRASOUND-GUIDED PARACENTESIS



HISTORY: Ascites. 



PROCEDURE:  The risks (including but not limited to bleeding, infection, and bowel injury) and benefi
ts were explained to the patient and informed consent was obtained.  A time out procedure was perform
ed.  



Ultrasound was used to evaluate the abdomen and locate the largest ascites fluid pocket.  Once the sk
in was marked, the procedure site was prepped and draped in the usual sterile fashion and lidocaine w
as used for local anesthesia.  A 5 Lao centesis catheter was placed.  The patient was monitored cl
osely throughout the procedure, and a total of 4800 mL of clear yellow fluid was aspirated.  Samples 
were sent to the lab for further evaluation per the primary clinicians orders.



The patient tolerated the procedure well with no complications. 



IMPRESSION: Successful ultrasound-guided paracentesis as described.



Signer Name: Fortunato Marcos Jr, MD 

Signed: 9/6/2022 2:17 PM

Workstation Name: [id number]

## 2022-09-06 NOTE — PROCEDURE NOTE
Date of procedure: 09/06/22


Pre-op diagnosis: ascites


Post-op diagnosis: same


Procedure: 


US paracentesis


Findings: 





moderate ascites


Anesthesia: local


Surgeon: EDMUND SANCHEZ


Estimated blood loss: none


Pathology: list (120cc)


Specimen disposition: to lab


Condition: stable


Disposition: floor

## 2022-09-06 NOTE — PROGRESS NOTE
Assessment and Plan


Assessment and plan: 





Assessment and plan: 


This is a 51-year-old female with known past medical history of ETOH abuse, 

liver cirrhosis s/p biliary stent placement, HTN, nicotine dependence, Guillan 

Bellwood Syndrome, IBS, and Obesity Hypoventilation Syndrome admitted for liver 

cirrhosis with ascites, acute hypoxic respiratory failure, and LLL pneumonia.





Hospital Course to Date:


9/3: Mentation improved, fully AAO this am. Desated in the 80s overnight, now 2L

NC, no respiratory distress noted. Patient remains afebrile and VSS, cultures 

pending, continue empiric IV Abx. Anasarca and +4 pitting edema noted, X1 dose 

of low dose IV Lasix ordered, patient is on IV steroids. Continue PO lactulose 

and PPI. GI consult pending. Patient also reported that she has been bedbound 

since April of this year. D-dimer is elevated, will also check BLE doppler to 

r/o DVT. Continue to trend LFTs. Get records from PCP and [facility].





9/4: imaging study from earlier reviewed, CT scan - abdomen: report reviewed 

(Mildly nodular liver, moderate ascites.  CBD stent in place.)


Patient still with increasing abdominal girth, GI input noted, patient started 

on Lasix and Rifaxmin. [facility] 

[place]. Plan for Paracentesis earliest possible time and rule out SBP. Will 

start on Empiric abx coverage for now. Sodium restrictions. Continue IMCU care 

at this time. Monitor Electrolytes with initiation of Diuretics. 





9/5: Remains on IV Lasix and PO aldactone, with adequate UOP. Still with 

significant pitting edema. Paracentesis pending. D/w GI, continue xifaxan, 

lactulose, and diuretics. Patient needs to follow-up with Dr. Young, 

[facility] in [place], for Biliary stent removal at discharge. 

Advance diet as tolerated. 





9/6: Awaiting paracentesis. Will follow up after completion.





Assessment and Plan


#Acute Hypoxic Respiratory Failure


#LLL Pneumonia (CAP)


- Desated in the 80s overnight, now on 2L NC


- CXR suggesting left basilar pneumonia


- COVID PCR negative


- Empiric IV Abx initiated- vero [id number]


- Patient is afebrile, VSS, and cultures with NGTD


- Continue O2 supplementation and wean as tolerated


- Continue SPO2 monitoring for SPO2 goal above 92%


- F/U on cultures


- Daily CBC monitor





#Alcoholic Cirrhosis of Liver with Ascites


#H/o Biliary Stentdue to Bile Leak


- Generalized anasarca and +4 pitting edema noted


- CTabd/pelvis reviewed


- On Xifaxan, Lactulose, IV lasix, and aldactone


- Continue PPI, taper off IV steroids


- GI consulted, appreciate recommendations


- abdominal US & paracentesis pending


- Continue to trend LFTs


- of noted,patient endorse that she has not drink for over a year and a half


- Patient needs to follow-up with Dr. Young, [facility] in [facility], for Biliary stent removal at discharge.





#Acute Hepatic Encephalopathy


#Hyperammonemia 


#H/o ETOH Abuse


- Presented with AMS, ammonia level mildly elevated at 61


- Most likely related to above


- CT head/brain with no acute intracranial abnormality


- On PO Lactulose, mentation improved, Fully AAO this am


- Patient reported she has not drink any alcohol for a years and a half


- On Thiamine, folic acid, multivitamin daily, and PRN CIWA protocol


- Avoid delirium


- PRN Analgesia for pain control


- Maintenance of sleep-wake cycle





#Hyponatremia


#Fluid Overload


- On diuretics


- Monitor and replace electrolytes as needed


- Trend BMP





#Hyperglycemia


- Per patient no previous history of DM, probably due to IV steroids


- BG check and SSI initiated ACHS


- Avoid hypoglycemia





#Elevated D-Dimer


- COVID PCR pending


- Check BLE doppler to r/o DVT


- Heparin subQ





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


- Chronic, no active treatment at this time.  


- No clinical symptoms present at this time.  Continue supportive measures


- Outpatient neurology follow-up





#Nicotine Dependence


- Current daily 1 pack a day cigarettes smoker for over 20 years


- Smoking cessation education and quitline resources provided


- Patient denied any urges at this time, Nicotine patch as needed





#Obesity Hypoventilation Syndrome


- Balanced diet, increase physical activity discharge


- outpatient pulmonary follow-up for sleep study.  


- Noninvasive positive pressure ventilation as clinically indicated.





#GI/DVT Prophylaxis


- PPI- protonix


- Heparin SubQ





#Advance Care Planning 


- Disease education data, care plan, diagnoses, and prognosis were discussed 

with patient at the bedside. Patient is a FULL code.  Patient acknowledged 

understanding and agreed with current care plan.





 


The high probability of a clinically significant, sudden or life threatening det

erioration of the [multiple] system(s) required my full and direct attention, 

intervention and personal management. The aggregate critical care time was [60] 

minutes. This time is in addition to time spent performing reported procedures 

but includes the following: 





[x] Data Review and interpretation 





[x] Patient assessment and monitoring of vital signs 





[x] Documentation 





[x] Medication orders and management





History


Interval history: 





NO complaints this AM.





Hospitalist Physical





- Physical exam


Narrative exam: 





Physical Exam:


VITAL SIGNS:  Reviewed.    


GENERAL:  The patient appears normally developed, Vital signs as documented.


HEAD:  No signs of head trauma.


EYES:  Pupils are equal.  Extraocular motions intact.  


EARS:  Hearing grossly intact.


MOUTH:  Oropharynx is normal. 


NECK:  No adenopathy, no JVD.  


CHEST:  Chest with clear breath sounds bilaterally.  No wheezes, rales, or 

rhonchi.  


CARDIAC:  Regular rate and rhythm.  S1 and S2, without murmurs, gallops, or 

rubs.


VASCULAR:  No Edema.  Peripheral pulses normal and equal in all extremities.


ABDOMEN:  Soft, non tender and non distended.  No   rebound or guarding, and no 

masses palpated.   Bowel Sounds normal.


MUSCULOSKELETAL:  Good range of motion of all major joints. Extremities without 

clubbing, cyanosis or edema.  


NEUROLOGIC EXAM:  Alert and oriented x 4. no focal sensory or strength deficits.

  


PSYCHIATRIC:  Mood normal.


SKIN:  detail exam as documented in skin assessment





- Constitutional


Vitals: 


                                        











Temp Pulse Resp BP Pulse Ox


 


 97.7 F   114 H  12   153/90   97 


 


 09/06/22 07:12  09/06/22 09:00  09/06/22 09:00  09/06/22 09:00  09/06/22 09:00











General appearance: Present: no acute distress





HEART Score





- HEART Score


Troponin: 


                                        











Troponin T  < 0.010 ng/mL (0.00-0.029)   09/02/22  Unknown














Results





- Labs


CBC & Chem 7: 


                                 09/06/22 04:05





                                 09/06/22 04:05


Labs: 


                             Laboratory Last Values











WBC  12.9 K/mm3 (4.5-11.0)  H  09/06/22  04:05    


 


RBC  3.73 M/mm3 (3.65-5.03)   09/06/22  04:05    


 


Hgb  10.7 gm/dl (10.1-14.3)   09/06/22  04:05    


 


Hct  33.2 % (30.3-42.9)   09/06/22  04:05    


 


MCV  89 fl (79-97)   09/06/22  04:05    


 


MCH  29 pg (28-32)   09/06/22  04:05    


 


MCHC  32 % (30-34)   09/06/22  04:05    


 


RDW  15.6 % (13.2-15.2)  H  09/06/22  04:05    


 


Plt Count  244 K/mm3 (140-440)   09/06/22  04:05    


 


Lymph % (Auto)  25.3 % (13.4-35.0)   09/02/22  Unknown


 


Mono % (Auto)  9.9 % (0.0-7.3)  H  09/02/22  Unknown


 


Eos % (Auto)  1.2 % (0.0-4.3)   09/02/22  Unknown


 


Baso % (Auto)  0.6 % (0.0-1.8)   09/02/22  Unknown


 


Lymph # (Auto)  2.6 K/mm3 (1.2-5.4)   09/02/22  Unknown


 


Mono # (Auto)  1.0 K/mm3 (0.0-0.8)  H  09/02/22  Unknown


 


Eos # (Auto)  0.1 K/mm3 (0.0-0.4)   09/02/22  Unknown


 


Baso # (Auto)  0.1 K/mm3 (0.0-0.1)   09/02/22  Unknown


 


Add Manual Diff  Complete   09/03/22  04:28    


 


Total Counted  100   09/03/22  04:28    


 


Seg Neutrophils %  Np   09/03/22  04:28    


 


Seg Neuts % (Manual)  87.0 % (40.0-70.0)  H  09/03/22  04:28    


 


Band Neutrophils %  0 %  09/03/22  04:28    


 


Lymphocytes % (Manual)  9.0 % (13.4-35.0)  L  09/03/22  04:28    


 


Reactive Lymphs % (Man)  0 %  09/03/22  04:28    


 


Monocytes % (Manual)  4.0 % (0.0-7.3)   09/03/22  04:28    


 


Eosinophils % (Manual)  0 % (0.0-4.3)   09/03/22  04:28    


 


Basophils % (Manual)  0 % (0.0-1.8)   09/03/22  04:28    


 


Metamyelocytes %  0 %  09/03/22  04:28    


 


Myelocytes %  0 %  09/03/22  04:28    


 


Promyelocytes %  0 %  09/03/22  04:28    


 


Blast Cells %  0 %  09/03/22  04:28    


 


Nucleated RBC %  Not Reportable   09/03/22  04:28    


 


Seg Neutrophils #  6.4 K/mm3 (1.8-7.7)   09/02/22  Unknown


 


Seg Neutrophils # Man  7.8 K/mm3 (1.8-7.7)  H  09/03/22  04:28    


 


Band Neutrophils #  0.0 K/mm3  09/03/22  04:28    


 


Lymphocytes # (Manual)  0.8 K/mm3 (1.2-5.4)  L  09/03/22  04:28    


 


Abs React Lymphs (Man)  0.0 K/mm3  09/03/22  04:28    


 


Monocytes # (Manual)  0.4 K/mm3 (0.0-0.8)   09/03/22  04:28    


 


Eosinophils # (Manual)  0.0 K/mm3 (0.0-0.4)   09/03/22  04:28    


 


Basophils # (Manual)  0.0 K/mm3 (0.0-0.1)   09/03/22  04:28    


 


Metamyelocytes #  0.0 K/mm3  09/03/22  04:28    


 


Myelocytes #  0.0 K/mm3  09/03/22  04:28    


 


Promyelocytes #  0.0 K/mm3  09/03/22  04:28    


 


Blast Cells #  0.0 K/mm3  09/03/22  04:28    


 


WBC Morphology  Not Reportable   09/03/22  04:28    


 


Hypersegmented Neuts  Not Reportable   09/03/22  04:28    


 


Hyposegmented Neuts  Not Reportable   09/03/22  04:28    


 


Hypogranular Neuts  Not Reportable   09/03/22  04:28    


 


Smudge Cells  Not Reportable   09/03/22  04:28    


 


Toxic Granulation  Not Reportable   09/03/22  04:28    


 


Toxic Vacuolation  Not Reportable   09/03/22  04:28    


 


Dohle Bodies  Not Reportable   09/03/22  04:28    


 


Pelger-Huet Anomaly  Not Reportable   09/03/22  04:28    


 


Brown Rods  Not Reportable   09/03/22  04:28    


 


Platelet Estimate  Not Reportable   09/03/22  04:28    


 


Clumped Platelets  Not Reportable   09/03/22  04:28    


 


Plt Clumps, EDTA  Not Reportable   09/03/22  04:28    


 


Large Platelets  Not Reportable   09/03/22  04:28    


 


Giant Platelets  Not Reportable   09/03/22  04:28    


 


Platelet Satelliting  Not Reportable   09/03/22  04:28    


 


Plt Morphology Comment  Not Reportable   09/03/22  04:28    


 


RBC Morphology  Normal   09/03/22  04:28    


 


Dimorphic RBCs  Not Reportable   09/03/22  04:28    


 


Polychromasia  Not Reportable   09/03/22  04:28    


 


Hypochromasia  Not Reportable   09/03/22  04:28    


 


Poikilocytosis  Not Reportable   09/03/22  04:28    


 


Anisocytosis  Not Reportable   09/03/22  04:28    


 


Microcytosis  Not Reportable   09/03/22  04:28    


 


Macrocytosis  Not Reportable   09/03/22  04:28    


 


Spherocytes  Not Reportable   09/03/22  04:28    


 


Pappenheimer Bodies  Not Reportable   09/03/22  04:28    


 


Sickle Cells  Not Reportable   09/03/22  04:28    


 


Target Cells  Not Reportable   09/03/22  04:28    


 


Tear Drop Cells  Not Reportable   09/03/22  04:28    


 


Ovalocytes  Not Reportable   09/03/22  04:28    


 


Helmet Cells  Not Reportable   09/03/22  04:28    


 


Gimenez-Oldham Bodies  Not Reportable   09/03/22  04:28    


 


Cabot Rings  Not Reportable   09/03/22  04:28    


 


Demetra Cells  Not Reportable   09/03/22  04:28    


 


Bite Cells  Not Reportable   09/03/22  04:28    


 


Crenated Cell  Not Reportable   09/03/22  04:28    


 


Elliptocytes  Not Reportable   09/03/22  04:28    


 


Acanthocytes (Spur)  Not Reportable   09/03/22  04:28    


 


Rouleaux  Not Reportable   09/03/22  04:28    


 


Hemoglobin C Crystals  Not Reportable   09/03/22  04:28    


 


Schistocytes  Not Reportable   09/03/22  04:28    


 


Malaria parasites  Not Reportable   09/03/22  04:28    


 


Bonifacio Bodies  Not Reportable   09/03/22  04:28    


 


Hem Pathologist Commnt  No   09/03/22  04:28    


 


PT  16.5 Sec. (12.2-14.9)  H  09/06/22  04:05    


 


INR  1.19  (0.87-1.13)  H  09/06/22  04:05    


 


APTT  38.3 Sec. (24.2-36.6)  H  09/06/22  04:05    


 


D-Dimer  2651.42 ng/mlDDU (0-234)  H  09/02/22  22:33    


 


Sodium  134 mmol/L (137-145)  L  09/06/22  04:05    


 


Potassium  3.8 mmol/L (3.6-5.0)   09/06/22  04:05    


 


Chloride  98.0 mmol/L ()   09/06/22  04:05    


 


Carbon Dioxide  23 mmol/L (22-30)   09/06/22  04:05    


 


Anion Gap  17 mmol/L  09/06/22  04:05    


 


BUN  17 mg/dL (7-17)   09/06/22  04:05    


 


Creatinine  1.0 mg/dL (0.6-1.2)   09/06/22  04:05    


 


Estimated GFR  59 ml/min  09/06/22  04:05    


 


BUN/Creatinine Ratio  17 %  09/06/22  04:05    


 


Glucose  156 mg/dL ()  H  09/06/22  04:05    


 


POC Glucose  186 mg/dL ()  H  09/03/22  17:11    


 


Calcium  8.1 mg/dL (8.4-10.2)  L  09/06/22  04:05    


 


Phosphorus  3.40 mg/dL (2.5-4.5)   09/06/22  04:05    


 


Magnesium  1.60 mg/dL (1.7-2.3)  L  09/06/22  04:05    


 


Total Bilirubin  0.50 mg/dL (0.1-1.2)   09/06/22  04:05    


 


AST  84 units/L (5-40)  H  09/06/22  04:05    


 


ALT  43 units/L (7-56)   09/06/22  04:05    


 


Alkaline Phosphatase  300 units/L ()  H  09/06/22  04:05    


 


Ammonia  26.0 umol/L (25-60)   09/04/22  08:28    


 


Lactate Dehydrogenase  144 units/L ()   09/02/22  22:33    


 


Troponin T  < 0.010 ng/mL (0.00-0.029)   09/02/22  Unknown


 


C-Reactive Protein  6.60 mg/dL (0.00-1.30)  H  09/02/22  22:33    


 


Total Protein  5.8 g/dL (6.3-8.2)  L  09/06/22  04:05    


 


Albumin  2.5 g/dL (3.9-5)  L  09/06/22  04:05    


 


Albumin/Globulin Ratio  0.8 %  09/06/22  04:05    


 


Procalcitonin  0.37 ng/mL (<0.15)   09/02/22  22:33    


 


Plasma/Serum Alcohol  < 0.01 % (0-0.07)   09/02/22  15:56    


 


Coronavirus (PCR)  Negative  (Negative)   09/02/22  17:50    











Microbiology: 


Microbiology





09/02/22 22:33   Peripheral/Venous   Blood Culture - Preliminary


                            NO GROWTH AFTER 72 HOURS


09/02/22 22:44   Peripheral/Venous   Blood Culture - Preliminary


                            NO GROWTH AFTER 72 HOURS








Hurt/IV: 


                                        





Voiding Method                   External Female Catheter











Active Medications





- Current Medications


Current Medications: 














Generic Name Dose Route Start Last Admin





  Trade Name Freq  PRN Reason Stop Dose Admin


 


Azithromycin  500 mg  09/03/22 10:00  09/05/22 11:14





  Azithromycin 250 Mg Tab  PO  09/07/22 10:01  500 mg





  QDAY NICOLE   Administration





  Protocol  


 


Dextrose  50 ml  09/03/22 09:09 





  Dextrose 50% In Water (25gm) 50 Ml Syringe  IV  





  Q30MIN PRN  





  Hypoglycemia  





  Protocol  


 


Folic Acid  1 mg  09/03/22 10:00  09/05/22 11:14





  Folic Acid 1 Mg Tab  PO   1 mg





  QDAY NICOLE   Administration


 


Furosemide  40 mg  09/04/22 10:00  09/05/22 11:13





  Furosemide 40 Mg/4 Ml Inj  IV  09/06/22 11:00  40 mg





  QDAY NICOLE   Administration


 


Furosemide  20 mg  09/07/22 10:00 





  Furosemide 20 Mg Tab  PO  





  QDAY NICOLE  


 


Heparin Sodium (Porcine)  5,000 unit  09/02/22 22:00  09/05/22 21:40





  Heparin 5,000 Unit/1 Ml Vial  SUB-Q   5,000 unit





  Q12HR NICOLE   Administration


 


Hydromorphone HCl  0.5 mg  09/02/22 16:00 





  Hydromorphone 0.5 Mg/0.5 Ml Inj  IV  





  Q23H PRN  





  Pain , Severe (7-10)  


 


Ceftriaxone Sodium  2 gm in 100 mls @ 200 mls/hr  09/02/22 16:00  09/05/22 16:22





  Rocephin/Ns 2 Gm/100 Ml  IV  09/06/22 16:29  200 mls/hr





  Q24H NICOLE   Administration





  Protocol  


 


Insulin Human Lispro  0 unit  09/03/22 12:00  09/06/22 00:31





  Insulin Lispro 100 Unit/Ml  SUB-Q   Not Given





  Q6HR [facility]  





  Protocol  


 


Lactulose  20 gm  09/03/22 10:00  09/05/22 11:14





  Lactulose 20 Gm/30 Ml Oral Liqd  PO   20 gm





  QDAY NICOLE   Administration


 


Magnesium Oxide  400 mg  09/03/22 10:00  09/05/22 11:15





  Magnesium Oxide 400 Mg Tab  PO   400 mg





  DAILY NICOLE   Administration


 


Methylprednisolone Sodium Succinate  40 mg  09/06/22 10:00 





  Methylprednisolone Sod Succinate 40 Mg/1 Ml Inj  IV  09/07/22 09:59 





  Q24HR [facility]  


 


Multivitamins  1 each  09/03/22 10:00  09/05/22 11:14





  Multivitamins ,Therapeutic Tab  PO   1 each





  QDAY NICOLE   Administration


 


Nortriptyline HCl  75 mg  09/03/22 10:00  09/05/22 11:14





  Nortriptyline 25 Mg Cap  PO   75 mg





  QDAY NICOLE   Administration


 


Oxycodone/Acetaminophen  1 tab  09/02/22 16:00  09/05/22 21:45





  Oxycodone /Acetaminophen 5-325mg Tab  PO   1 tab





  Q16H PRN   Administration





  Pain, Moderate (4-6)  


 


Pantoprazole Sodium  40 mg  09/03/22 10:00  09/05/22 11:14





  Pantoprazole 40 Mg Tab  PO   40 mg





  DAILY NICOLE   Administration


 


Rifaximin  550 mg  09/04/22 10:00  09/05/22 21:40





  Rifaximin 550 Mg Tab  PO   550 mg





  BID NICOLE   Administration


 


Sodium Chloride  10 ml  09/02/22 22:00  09/05/22 21:41





  Sodium Chloride 0.9% 10 Ml Flush Syringe  IV   10 ml





  BID NICOLE   Administration


 


Sodium Chloride  10 ml  09/02/22 15:34 





  Sodium Chloride 0.9% 10 Ml Flush Syringe  IV  09/07/22 15:33 





  PRN PRN  





  LINE FLUSH  


 


Spironolactone  50 mg  09/03/22 20:00  09/05/22 11:15





  Spironolactone 50 Mg Tab  PO   50 mg





  QDAY NICOLE   Administration


 


Thiamine HCl  100 mg  09/03/22 10:00  09/05/22 11:14





  Thiamine 100 Mg Tab  PO   100 mg





  QDAY NICOLE   Administration














Nutrition/Malnutrition Assess





- Dietary Evaluation


Nutrition/Malnutrition Findings: 


                                        





Nutrition Notes                                            Start:  09/03/22 

10:09


Freq:                                                      Status: Active       

 


Protocol:                                                                       

 


 Document     09/05/22 12:43  JENISE  (Rec: 09/05/22 13:01  JENISE  [id number])


 Nutrition Notes


     Initial or Follow up                        Brief Note


     Current Diagnosis                           Hypertension


     Other Pertinent Diagnosis                   EtOH Dependence/Cirrhosis/


                                                 Ascites, Hepatic


                                                 Encephalopathy, ...


     Current Diet                                Clear Liquids -Low Sodium-


                                                 Diet (since D 09/03).


     Height                                      5 ft 5 in


     Weight                                      117.9 kg


     Ideal Body Weight (kg)                      56.81


     BMI                                         43.2


     Weight change and time frame                No body weight change reported


                                                 in 2 days.


     Weight Status                               Morbidly Obese


     Subjective/Other Information                RD consult for routine F/U on


                                                 dietary advancement.


                                                 Diet advanced to PO, No


                                                 reports available on Pt's PO


                                                 intake of meals at the time,


                                                 will assess at F/U.


                                                 Pt is on Nasal Cannula. O2


                                                 saturation @ 98%, according to


                                                 Physical Assessment History


                                                 notes.


                                                 Pt presents Bilateral-LE


                                                 Pitting Edema 4+, according to


                                                 Physical Assessment History


                                                 notes.


                                                 Pt continues presenting


                                                 Ascites, according to Progress


                                                 notes.


     Percent of energy/protein needs met:        Prescribed Clear Liquids -Low


                                                 Sodium- Diet provides for


                                                 energy/protein needs (590 Kcal


                                                 /16 g) during LOS


     Fluid Accumulation                          Moderate to Severe (severe)


     #1


      Nutrition Diagnosis                        Overweight/obesity


      Diagnosis Progress(for reassessment        Continues


       documentation)                            


     Is patient on ventilator?                   No


     Is Patient Ambulatory and/or Out of Bed     No


     REE-([facility]-confined to bed)      2169.144


     Kcal/Kg value to use for calculation        13


     Approximate Energy Requirements Using       1533


      kcal/Kg                                    


     Calculation Used for Recommendations        Kcal/kg


     Additional Notes                            Protein: 0.6-0.8 g/Kg AdjBW;


                                                 53-70 g/day.


                                                 Fluids: 1 ml/Kcal, or as per


                                                 MD.


 Nutrition Intervention


     Change Diet Order:                          Continue Clear Liquids -Low


                                                 Sodium- Diet as tolerated.


     Goal #1                                     Adjust the dietary


                                                 intervention to better serve


                                                 Pt's needs and clinical


                                                 conditions during LOS.


     Follow-Up By:                               09/12/22


     Additional Comments                         Continue monitoring food


                                                 tolerance, %PO intake of meals


                                                 , and BM.

## 2022-09-07 PROCEDURE — 5A09357 ASSISTANCE WITH RESPIRATORY VENTILATION, LESS THAN 24 CONSECUTIVE HOURS, CONTINUOUS POSITIVE AIRWAY PRESSURE: ICD-10-PCS | Performed by: INTERNAL MEDICINE

## 2022-09-07 RX ADMIN — AZITHROMYCIN SCH MG: 250 TABLET, FILM COATED ORAL at 09:31

## 2022-09-07 RX ADMIN — RIFAXIMIN SCH MG: 550 TABLET ORAL at 09:31

## 2022-09-07 RX ADMIN — PANTOPRAZOLE SODIUM SCH MG: 40 TABLET, DELAYED RELEASE ORAL at 09:31

## 2022-09-07 RX ADMIN — INSULIN LISPRO SCH: 100 INJECTION, SOLUTION INTRAVENOUS; SUBCUTANEOUS at 13:13

## 2022-09-07 RX ADMIN — RIFAXIMIN SCH MG: 550 TABLET ORAL at 21:23

## 2022-09-07 RX ADMIN — NORTRIPTYLINE HYDROCHLORIDE SCH MG: 25 CAPSULE ORAL at 09:30

## 2022-09-07 RX ADMIN — FUROSEMIDE SCH MG: 10 INJECTION, SOLUTION INTRAVENOUS at 17:52

## 2022-09-07 RX ADMIN — HEPARIN SODIUM SCH UNIT: 5000 INJECTION, SOLUTION INTRAVENOUS; SUBCUTANEOUS at 09:31

## 2022-09-07 RX ADMIN — MULTIVITAMIN TABLET SCH EACH: TABLET at 09:32

## 2022-09-07 RX ADMIN — LACTULOSE SCH GM: 20 SOLUTION ORAL at 09:34

## 2022-09-07 RX ADMIN — Medication SCH ML: at 09:32

## 2022-09-07 RX ADMIN — INSULIN LISPRO SCH: 100 INJECTION, SOLUTION INTRAVENOUS; SUBCUTANEOUS at 17:52

## 2022-09-07 RX ADMIN — FOLIC ACID SCH MG: 1 TABLET ORAL at 09:31

## 2022-09-07 RX ADMIN — HEPARIN SODIUM SCH UNIT: 5000 INJECTION, SOLUTION INTRAVENOUS; SUBCUTANEOUS at 21:23

## 2022-09-07 RX ADMIN — Medication SCH ML: at 21:23

## 2022-09-07 RX ADMIN — INSULIN LISPRO SCH: 100 INJECTION, SOLUTION INTRAVENOUS; SUBCUTANEOUS at 05:56

## 2022-09-07 RX ADMIN — SPIRONOLACTONE SCH MG: 50 TABLET ORAL at 09:32

## 2022-09-07 RX ADMIN — INSULIN LISPRO SCH UNIT: 100 INJECTION, SOLUTION INTRAVENOUS; SUBCUTANEOUS at 00:00

## 2022-09-07 RX ADMIN — Medication SCH MG: at 09:31

## 2022-09-07 RX ADMIN — Medication SCH MG: at 09:34

## 2022-09-07 NOTE — PROGRESS NOTE
Assessment and Plan


Assessment and plan: 





Assessment and plan: 


This is a 51-year-old female with known past medical history of ETOH abuse, 

liver cirrhosis s/p biliary stent placement, HTN, nicotine dependence, Guillan 

Palestine Syndrome, IBS, and Obesity Hypoventilation Syndrome admitted for liver 

cirrhosis with ascites, acute hypoxic respiratory failure, and LLL pneumonia.





Hospital Course to Date:


9/3: Mentation improved, fully AAO this am. Desated in the 80s overnight, now 2L

NC, no respiratory distress noted. Patient remains afebrile and VSS, cultures 

pending, continue empiric IV Abx. Anasarca and +4 pitting edema noted, X1 dose 

of low dose IV Lasix ordered, patient is on IV steroids. Continue PO lactulose 

and PPI. GI consult pending. Patient also reported that she has been bedbound 

since April of this year. D-dimer is elevated, will also check BLE doppler to 

r/o DVT. Continue to trend LFTs. [facility] and [facility].





9/4: imaging study from earlier reviewed, CT scan - abdomen: report reviewed 

(Mildly nodular liver, moderate ascites.  CBD stent in place.)


Patient still with increasing abdominal girth, GI input noted, patient started 

on Lasix and Rifaxmin. [facility] Plan for Paracentesis earliest possible time and rule out SBP. Will 

start on Empiric abx coverage for now. Sodium restrictions. Continue IMCU care 

at this time. Monitor Electrolytes with initiation of Diuretics. 





9/5: Remains on IV Lasix and PO aldactone, with adequate UOP. Still with 

significant pitting edema. Paracentesis pending. D/w GI, continue xifaxan, 

lactulose, and diuretics. Patient needs to follow-up with Dr. Young, 

[facility] in [place], for Biliary stent removal at discharge. 

Advance diet as tolerated. 





9/6: Awaiting paracentesis. Will follow up after completion.





9/7: Still hypoxic on 3l/min nc. Will need O2 eval. changed lasix po to Lasix IV

x 3doses. Will reassess tomorrow. CXR ordered for AM. Hopefully d/c in next 24-

48 hrs.





Assessment and Plan


#Acute Hypoxic Respiratory Failure


#LLL Pneumonia (CAP)


- Desated in the 80s overnight, now on 2L NC


- CXR suggesting left basilar pneumonia


- COVID PCR negative


- Empiric IV Abx initiated- yessica [id number]


- Patient is afebrile, VSS, and cultures with NGTD


- Continue O2 supplementation and wean as tolerated


- Continue SPO2 monitoring for SPO2 goal above 92%


- F/U on cultures


- Daily CBC monitor





#Alcoholic Cirrhosis of Liver with Ascites


#H/o Biliary Stentdue to Bile Leak


- Generalized anasarca and +4 pitting edema noted


- CTabd/pelvis reviewed


- On Xifaxan, Lactulose, IV lasix, and aldactone


- Continue PPI, taper off IV steroids


- GI consulted, appreciate recommendations


- abdominal US & paracentesis pending


- Continue to trend LFTs


- of noted,patient endorse that she has not drink for over a year and a half


- Patient needs to follow-up with Dr. Young, [facility] in 

[place], for Biliary stent removal at discharge.





#Acute Hepatic Encephalopathy


#Hyperammonemia 


#H/o ETOH Abuse


- Presented with AMS, ammonia level mildly elevated at 61


- Most likely related to above


- CT head/brain with no acute intracranial abnormality


- On PO Lactulose, mentation improved, Fully AAO this am


- Patient reported she has not drink any alcohol for a years and a half


- On Thiamine, folic acid, multivitamin daily, and PRN CIWA protocol


- Avoid delirium


- PRN Analgesia for pain control


- Maintenance of sleep-wake cycle





#Hyponatremia


#Fluid Overload


- On diuretics


- Monitor and replace electrolytes as needed


- Trend BMP





#Hyperglycemia


- Per patient no previous history of DM, probably due to IV steroids


- BG check and SSI initiated ACHS


- Avoid hypoglycemia





#Elevated D-Dimer


- COVID PCR pending


- Check BLE doppler to r/o DVT


- Heparin subQ





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


- Chronic, no active treatment at this time.  


- No clinical symptoms present at this time.  Continue supportive measures


- Outpatient neurology follow-up





#Nicotine Dependence


- Current daily 1 pack a day cigarettes smoker for over 20 years


- Smoking cessation education and quitline resources provided


- Patient denied any urges at this time, Nicotine patch as needed





#Obesity Hypoventilation Syndrome


- Balanced diet, increase physical activity discharge


- outpatient pulmonary follow-up for sleep study.  


- Noninvasive positive pressure ventilation as clinically indicated.





#GI/DVT Prophylaxis


- PPI- protonix


- Heparin SubQ





#Advance Care Planning 


- Disease education data, care plan, diagnoses, and prognosis were discussed 

with patient at the bedside. Patient is a FULL code.  Patient acknowledged 

understanding and agreed with current care plan.





 


The high probability of a clinically significant, sudden or life threatening 

deterioration of the [multiple] system(s) required my full and direct attention,

 intervention and personal management. The aggregate critical care time was [60]

 minutes. This time is in addition to time spent performing reported procedures 

but includes the following: 





[x] Data Review and interpretation 





[x] Patient assessment and monitoring of vital signs 





[x] Documentation 





[x] Medication orders and management





History


Interval history: 





Saw and evaluated patient. No acute complaints. LE edema noted. patient 

currently on 3l/min nasal cannula.





Hospitalist Physical





- Physical exam


Narrative exam: 





Physical Exam:


VITAL SIGNS:  Reviewed.    


GENERAL:  The patient appears normally developed, Vital signs as documented.


HEAD:  No signs of head trauma.


EYES:  Pupils are equal.  Extraocular motions intact.  


EARS:  Hearing grossly intact.


MOUTH:  Oropharynx is normal. 


NECK:  No adenopathy, no JVD.  


CHEST:  Chest with clear breath sounds bilaterally.  No wheezes, rales, or 

rhonchi.  


CARDIAC:  Regular rate and rhythm.  S1 and S2, without murmurs, gallops, or 

rubs.


VASCULAR:  No Edema.  Peripheral pulses normal and equal in all extremities.


ABDOMEN:  Soft, non tender and non distended.  No   rebound or guarding, and no 

masses palpated.   Bowel Sounds normal.


MUSCULOSKELETAL:  Good range of motion of all major joints. Extremities without 

clubbing, cyanosis or edema.  


NEUROLOGIC EXAM:  Alert and oriented x 4. no focal sensory or strength deficits.

  


PSYCHIATRIC:  Mood normal.


SKIN:  detail exam as documented in skin assessment





- Constitutional


Vitals: 


                                        











Temp Pulse Resp BP Pulse Ox


 


 98.2 F   127 H  13   116/84   93 


 


 09/07/22 04:00  09/07/22 09:32  09/07/22 09:00  09/07/22 09:32  09/07/22 09:00











General appearance: Present: no acute distress





HEART Score





- HEART Score


Troponin: 


                                        











Troponin T  < 0.010 ng/mL (0.00-0.029)   09/02/22  Unknown














Results





- Labs


CBC & Chem 7: 


                                 09/06/22 04:05





                                 09/06/22 04:05


Labs: 


                             Laboratory Last Values











WBC  12.9 K/mm3 (4.5-11.0)  H  09/06/22  04:05    


 


RBC  3.73 M/mm3 (3.65-5.03)   09/06/22  04:05    


 


Hgb  10.7 gm/dl (10.1-14.3)   09/06/22  04:05    


 


Hct  33.2 % (30.3-42.9)   09/06/22  04:05    


 


MCV  89 fl (79-97)   09/06/22  04:05    


 


MCH  29 pg (28-32)   09/06/22  04:05    


 


MCHC  32 % (30-34)   09/06/22  04:05    


 


RDW  15.6 % (13.2-15.2)  H  09/06/22  04:05    


 


Plt Count  244 K/mm3 (140-440)   09/06/22  04:05    


 


Lymph % (Auto)  25.3 % (13.4-35.0)   09/02/22  Unknown


 


Mono % (Auto)  9.9 % (0.0-7.3)  H  09/02/22  Unknown


 


Eos % (Auto)  1.2 % (0.0-4.3)   09/02/22  Unknown


 


Baso % (Auto)  0.6 % (0.0-1.8)   09/02/22  Unknown


 


Lymph # (Auto)  2.6 K/mm3 (1.2-5.4)   09/02/22  Unknown


 


Mono # (Auto)  1.0 K/mm3 (0.0-0.8)  H  09/02/22  Unknown


 


Eos # (Auto)  0.1 K/mm3 (0.0-0.4)   09/02/22  Unknown


 


Baso # (Auto)  0.1 K/mm3 (0.0-0.1)   09/02/22  Unknown


 


Add Manual Diff  Complete   09/03/22  04:28    


 


Total Counted  100   09/03/22  04:28    


 


Seg Neutrophils %  Np   09/03/22  04:28    


 


Seg Neuts % (Manual)  87.0 % (40.0-70.0)  H  09/03/22  04:28    


 


Band Neutrophils %  0 %  09/03/22  04:28    


 


Lymphocytes % (Manual)  9.0 % (13.4-35.0)  L  09/03/22  04:28    


 


Reactive Lymphs % (Man)  0 %  09/03/22  04:28    


 


Monocytes % (Manual)  4.0 % (0.0-7.3)   09/03/22  04:28    


 


Eosinophils % (Manual)  0 % (0.0-4.3)   09/03/22  04:28    


 


Basophils % (Manual)  0 % (0.0-1.8)   09/03/22  04:28    


 


Metamyelocytes %  0 %  09/03/22  04:28    


 


Myelocytes %  0 %  09/03/22  04:28    


 


Promyelocytes %  0 %  09/03/22  04:28    


 


Blast Cells %  0 %  09/03/22  04:28    


 


Nucleated RBC %  Not Reportable   09/03/22  04:28    


 


Seg Neutrophils #  6.4 K/mm3 (1.8-7.7)   09/02/22  Unknown


 


Seg Neutrophils # Man  7.8 K/mm3 (1.8-7.7)  H  09/03/22  04:28    


 


Band Neutrophils #  0.0 K/mm3  09/03/22  04:28    


 


Lymphocytes # (Manual)  0.8 K/mm3 (1.2-5.4)  L  09/03/22  04:28    


 


Abs React Lymphs (Man)  0.0 K/mm3  09/03/22  04:28    


 


Monocytes # (Manual)  0.4 K/mm3 (0.0-0.8)   09/03/22  04:28    


 


Eosinophils # (Manual)  0.0 K/mm3 (0.0-0.4)   09/03/22  04:28    


 


Basophils # (Manual)  0.0 K/mm3 (0.0-0.1)   09/03/22  04:28    


 


Metamyelocytes #  0.0 K/mm3  09/03/22  04:28    


 


Myelocytes #  0.0 K/mm3  09/03/22  04:28    


 


Promyelocytes #  0.0 K/mm3  09/03/22  04:28    


 


Blast Cells #  0.0 K/mm3  09/03/22  04:28    


 


WBC Morphology  Not Reportable   09/03/22  04:28    


 


Hypersegmented Neuts  Not Reportable   09/03/22  04:28    


 


Hyposegmented Neuts  Not Reportable   09/03/22  04:28    


 


Hypogranular Neuts  Not Reportable   09/03/22  04:28    


 


Smudge Cells  Not Reportable   09/03/22  04:28    


 


Toxic Granulation  Not Reportable   09/03/22  04:28    


 


Toxic Vacuolation  Not Reportable   09/03/22  04:28    


 


Dohle Bodies  Not Reportable   09/03/22  04:28    


 


Pelger-Huet Anomaly  Not Reportable   09/03/22  04:28    


 


Brown Rods  Not Reportable   09/03/22  04:28    


 


Platelet Estimate  Not Reportable   09/03/22  04:28    


 


Clumped Platelets  Not Reportable   09/03/22  04:28    


 


Plt Clumps, EDTA  Not Reportable   09/03/22  04:28    


 


Large Platelets  Not Reportable   09/03/22  04:28    


 


Giant Platelets  Not Reportable   09/03/22  04:28    


 


Platelet Satelliting  Not Reportable   09/03/22  04:28    


 


Plt Morphology Comment  Not Reportable   09/03/22  04:28    


 


RBC Morphology  Normal   09/03/22  04:28    


 


Dimorphic RBCs  Not Reportable   09/03/22  04:28    


 


Polychromasia  Not Reportable   09/03/22  04:28    


 


Hypochromasia  Not Reportable   09/03/22  04:28    


 


Poikilocytosis  Not Reportable   09/03/22  04:28    


 


Anisocytosis  Not Reportable   09/03/22  04:28    


 


Microcytosis  Not Reportable   09/03/22  04:28    


 


Macrocytosis  Not Reportable   09/03/22  04:28    


 


Spherocytes  Not Reportable   09/03/22  04:28    


 


Pappenheimer Bodies  Not Reportable   09/03/22  04:28    


 


Sickle Cells  Not Reportable   09/03/22  04:28    


 


Target Cells  Not Reportable   09/03/22  04:28    


 


Tear Drop Cells  Not Reportable   09/03/22  04:28    


 


Ovalocytes  Not Reportable   09/03/22  04:28    


 


Helmet Cells  Not Reportable   09/03/22  04:28    


 


Gimenez-Ackerly Bodies  Not Reportable   09/03/22  04:28    


 


Cabot Rings  Not Reportable   09/03/22  04:28    


 


Sachse Cells  Not Reportable   09/03/22  04:28    


 


Bite Cells  Not Reportable   09/03/22  04:28    


 


Crenated Cell  Not Reportable   09/03/22  04:28    


 


Elliptocytes  Not Reportable   09/03/22  04:28    


 


Acanthocytes (Spur)  Not Reportable   09/03/22  04:28    


 


Rouleaux  Not Reportable   09/03/22  04:28    


 


Hemoglobin C Crystals  Not Reportable   09/03/22  04:28    


 


Schistocytes  Not Reportable   09/03/22  04:28    


 


Malaria parasites  Not Reportable   09/03/22  04:28    


 


Bonifacio Bodies  Not Reportable   09/03/22  04:28    


 


Hem Pathologist Commnt  No   09/03/22  04:28    


 


PT  16.5 Sec. (12.2-14.9)  H  09/06/22  04:05    


 


INR  1.19  (0.87-1.13)  H  09/06/22  04:05    


 


APTT  38.3 Sec. (24.2-36.6)  H  09/06/22  04:05    


 


D-Dimer  2651.42 ng/mlDDU (0-234)  H  09/02/22  22:33    


 


Sodium  134 mmol/L (137-145)  L  09/06/22  04:05    


 


Potassium  3.8 mmol/L (3.6-5.0)   09/06/22  04:05    


 


Chloride  98.0 mmol/L ()   09/06/22  04:05    


 


Carbon Dioxide  23 mmol/L (22-30)   09/06/22  04:05    


 


Anion Gap  17 mmol/L  09/06/22  04:05    


 


BUN  17 mg/dL (7-17)   09/06/22  04:05    


 


Creatinine  1.0 mg/dL (0.6-1.2)   09/06/22  04:05    


 


Estimated GFR  59 ml/min  09/06/22  04:05    


 


BUN/Creatinine Ratio  17 %  09/06/22  04:05    


 


Glucose  156 mg/dL ()  H  09/06/22  04:05    


 


POC Glucose  149 mg/dL ()  H  09/04/22  06:43    


 


Calcium  8.1 mg/dL (8.4-10.2)  L  09/06/22  04:05    


 


Phosphorus  3.40 mg/dL (2.5-4.5)   09/06/22  04:05    


 


Magnesium  1.60 mg/dL (1.7-2.3)  L  09/06/22  04:05    


 


Total Bilirubin  0.50 mg/dL (0.1-1.2)   09/06/22  04:05    


 


AST  84 units/L (5-40)  H  09/06/22  04:05    


 


ALT  43 units/L (7-56)   09/06/22  04:05    


 


Alkaline Phosphatase  300 units/L ()  H  09/06/22  04:05    


 


Ammonia  26.0 umol/L (25-60)   09/04/22  08:28    


 


Lactate Dehydrogenase  144 units/L ()   09/02/22  22:33    


 


Troponin T  < 0.010 ng/mL (0.00-0.029)   09/02/22  Unknown


 


C-Reactive Protein  6.60 mg/dL (0.00-1.30)  H  09/02/22  22:33    


 


Total Protein  5.8 g/dL (6.3-8.2)  L  09/06/22  04:05    


 


Albumin  2.5 g/dL (3.9-5)  L  09/06/22  04:05    


 


Albumin/Globulin Ratio  0.8 %  09/06/22  04:05    


 


Procalcitonin  0.37 ng/mL (<0.15)   09/02/22  22:33    


 


Fluid Type  Ascitic   09/06/22  Unknown


 


Fluid Color  Straw   09/06/22  Unknown


 


Fluid Appearance  Hazy   09/06/22  Unknown


 


Fluid WBC  51 /mm3  09/06/22  Unknown


 


Fluid RBC  21 /mm3  09/06/22  Unknown


 


Fluid Seg Neutrophils  23.0 %  09/06/22  Unknown


 


Fluid Lymphocytes  63.0 %  09/06/22  Unknown


 


Fluid Monocytes  14.0 %  09/06/22  Unknown


 


Plasma/Serum Alcohol  < 0.01 % (0-0.07)   09/02/22  15:56    


 


Coronavirus (PCR)  Negative  (Negative)   09/02/22  17:50    











Microbiology: 


Microbiology





09/06/22 Unknown   Ascities Fluid   Body Fluid Culture - Preliminary


09/02/22 22:33   Peripheral/Venous   Blood Culture - Preliminary


                            NO GROWTH AFTER 4 DAYS


09/02/22 22:44   Peripheral/Venous   Blood Culture - Preliminary


                            NO GROWTH AFTER 4 DAYS








Hurt/IV: 


                                        





Voiding Method                   External Female Catheter











Active Medications





- Current Medications


Current Medications: 














Generic Name Dose Route Start Last Admin





  Trade Name Freq  PRN Reason Stop Dose Admin


 


Dextrose  50 ml  09/03/22 09:09 





  Dextrose 50% In Water (25gm) 50 Ml Syringe  IV  





  Q30MIN PRN  





  Hypoglycemia  





  Protocol  


 


Folic Acid  1 mg  09/03/22 10:00  09/07/22 09:31





  Folic Acid 1 Mg Tab  PO   1 mg





  QDAY NICOLE   Administration


 


Furosemide  40 mg  09/07/22 18:00 





  Furosemide 40 Mg/4 Ml Inj  IV  09/08/22 18:01 





  0600,1800 [facility]  


 


Heparin Sodium (Porcine)  5,000 unit  09/02/22 22:00  09/07/22 09:31





  Heparin 5,000 Unit/1 Ml Vial  SUB-Q   5,000 unit





  Q12HR NICOLE   Administration


 


Hydromorphone HCl  0.5 mg  09/02/22 16:00 





  Hydromorphone 0.5 Mg/0.5 Ml Inj  IV  





  Q23H PRN  





  Pain , Severe (7-10)  


 


Insulin Human Lispro  0 unit  09/03/22 12:00  09/07/22 05:56





  Insulin Lispro 100 Unit/Ml  SUB-Q   Not Given





  Q6HR [facility]  





  Protocol  


 


Lactulose  20 gm  09/03/22 10:00  09/07/22 09:34





  Lactulose 20 Gm/30 Ml Oral Liqd  PO   20 gm





  QDAY NICOLE   Administration


 


Magnesium Oxide  400 mg  09/03/22 10:00  09/07/22 09:34





  Magnesium Oxide 400 Mg Tab  PO   400 mg





  DAILY NICOLE   Administration


 


Multivitamins  1 each  09/03/22 10:00  09/07/22 09:32





  Multivitamins ,Therapeutic Tab  PO   1 each





  QDAY NICOLE   Administration


 


Nortriptyline HCl  75 mg  09/03/22 10:00  09/07/22 09:30





  Nortriptyline 25 Mg Cap  PO   75 mg





  QDAY NICOLE   Administration


 


Oxycodone/Acetaminophen  1 tab  09/02/22 16:00  09/05/22 21:45





  Oxycodone /Acetaminophen 5-325mg Tab  PO   1 tab





  Q16H PRN   Administration





  Pain, Moderate (4-6)  


 


Pantoprazole Sodium  40 mg  09/03/22 10:00  09/07/22 09:31





  Pantoprazole 40 Mg Tab  PO   40 mg





  DAILY NICOLE   Administration


 


Rifaximin  550 mg  09/04/22 10:00  09/07/22 09:31





  Rifaximin 550 Mg Tab  PO   550 mg





  BID NICOLE   Administration


 


Sodium Chloride  10 ml  09/02/22 22:00  09/07/22 09:32





  Sodium Chloride 0.9% 10 Ml Flush Syringe  IV   10 ml





  BID NICOLE   Administration


 


Sodium Chloride  10 ml  09/02/22 15:34 





  Sodium Chloride 0.9% 10 Ml Flush Syringe  IV  09/07/22 15:33 





  PRN PRN  





  LINE FLUSH  


 


Spironolactone  50 mg  09/03/22 20:00  09/07/22 09:32





  Spironolactone 50 Mg Tab  PO   50 mg





  QDAY NICOLE   Administration


 


Thiamine HCl  100 mg  09/03/22 10:00  09/07/22 09:31





  Thiamine 100 Mg Tab  PO   100 mg





  QDAY NICOLE   Administration














Nutrition/Malnutrition Assess





- Dietary Evaluation


Nutrition/Malnutrition Findings: 


                                        





Nutrition Notes                                            Start:  09/03/22 

10:09


Freq:                                                      Status: Active       

 


Protocol:                                                                       

 


 Document     09/05/22 12:43  JENISE  (Rec: 09/05/22 13:01  JENISE  [id number])


 Nutrition Notes


     Initial or Follow up                        Brief Note


     Current Diagnosis                           Hypertension


     Other Pertinent Diagnosis                   EtOH Dependence/Cirrhosis/


                                                 Ascites, Hepatic


                                                 Encephalopathy, ...


     Current Diet                                Clear Liquids -Low Sodium-


                                                 Diet (since D 09/03).


     Height                                      5 ft 5 in


     Weight                                      117.9 kg


     Ideal Body Weight (kg)                      56.81


     BMI                                         43.2


     Weight change and time frame                No body weight change reported


                                                 in 2 days.


     Weight Status                               Morbidly Obese


     Subjective/Other Information                RD consult for routine F/U on


                                                 dietary advancement.


                                                 Diet advanced to PO, No


                                                 reports available on Pt's PO


                                                 intake of meals at the time,


                                                 will assess at F/U.


                                                 Pt is on Nasal Cannula. O2


                                                 saturation @ 98%, according to


                                                 Physical Assessment History


                                                 notes.


                                                 Pt presents Bilateral-LE


                                                 Pitting Edema 4+, according to


                                                 Physical Assessment History


                                                 notes.


                                                 Pt continues presenting


                                                 Ascites, according to Progress


                                                 notes.


     Percent of energy/protein needs met:        Prescribed Clear Liquids -Low


                                                 Sodium- Diet provides for


                                                 energy/protein needs (590 Kcal


                                                 /16 g) during LOS


     Fluid Accumulation                          Moderate to Severe (severe)


     #1


      Nutrition Diagnosis                        Overweight/obesity


      Diagnosis Progress(for reassessment        Continues


       documentation)                            


     Is patient on ventilator?                   No


     Is Patient Ambulatory and/or Out of Bed     No


     REE-([facility]-confined to bed)      2169.144


     Kcal/Kg value to use for calculation        13


     Approximate Energy Requirements Using       1533


      kcal/Kg                                    


     Calculation Used for Recommendations        Kcal/kg


     Additional Notes                            Protein: 0.6-0.8 g/Kg AdjBW;


                                                 53-70 g/day.


                                                 Fluids: 1 ml/Kcal, or as per


                                                 MD.


 Nutrition Intervention


     Change Diet Order:                          Continue Clear Liquids -Low


                                                 Sodium- Diet as tolerated.


     Goal #1                                     Adjust the dietary


                                                 intervention to better serve


                                                 Pt's needs and clinical


                                                 conditions during LOS.


     Follow-Up By:                               09/12/22


     Additional Comments                         Continue monitoring food


                                                 tolerance, %PO intake of meals


                                                 , and BM.

## 2022-09-08 VITALS — SYSTOLIC BLOOD PRESSURE: 172 MMHG | DIASTOLIC BLOOD PRESSURE: 136 MMHG

## 2022-09-08 RX ADMIN — INSULIN LISPRO SCH: 100 INJECTION, SOLUTION INTRAVENOUS; SUBCUTANEOUS at 12:14

## 2022-09-08 RX ADMIN — MULTIVITAMIN TABLET SCH EACH: TABLET at 10:17

## 2022-09-08 RX ADMIN — FOLIC ACID SCH MG: 1 TABLET ORAL at 10:17

## 2022-09-08 RX ADMIN — INSULIN LISPRO SCH: 100 INJECTION, SOLUTION INTRAVENOUS; SUBCUTANEOUS at 00:36

## 2022-09-08 RX ADMIN — SPIRONOLACTONE SCH MG: 50 TABLET ORAL at 10:17

## 2022-09-08 RX ADMIN — FUROSEMIDE SCH MG: 10 INJECTION, SOLUTION INTRAVENOUS at 05:51

## 2022-09-08 RX ADMIN — Medication SCH MG: at 10:17

## 2022-09-08 RX ADMIN — RIFAXIMIN SCH MG: 550 TABLET ORAL at 10:17

## 2022-09-08 RX ADMIN — HEPARIN SODIUM SCH UNIT: 5000 INJECTION, SOLUTION INTRAVENOUS; SUBCUTANEOUS at 10:18

## 2022-09-08 RX ADMIN — PANTOPRAZOLE SODIUM SCH MG: 40 TABLET, DELAYED RELEASE ORAL at 10:17

## 2022-09-08 RX ADMIN — Medication SCH ML: at 10:18

## 2022-09-08 RX ADMIN — INSULIN LISPRO SCH: 100 INJECTION, SOLUTION INTRAVENOUS; SUBCUTANEOUS at 05:50

## 2022-09-08 RX ADMIN — Medication SCH MG: at 10:18

## 2022-09-08 RX ADMIN — LACTULOSE SCH GM: 20 SOLUTION ORAL at 10:18

## 2022-09-08 RX ADMIN — NORTRIPTYLINE HYDROCHLORIDE SCH MG: 25 CAPSULE ORAL at 10:05

## 2022-09-08 NOTE — DISCHARGE SUMMARY
Providers





- Providers


Date of Admission: 


09/02/22 15:34





Date of discharge: 09/08/22


Attending physician: 


KALIN YO MD





                                        





09/02/22 15:34


Consult to Physician [CONS] Routine 


   Comment: 


   Consulting Provider: ROLANDO GILL


   Physician Instructions: 


   Reason For Exam: hepatic encephlopathy





09/03/22 09:09


Consult to Dietitian/Nutrition [CONS] Routine 


   Physician Instructions: 


   Reason For Exam: 


   Reason for Consult: Malnutrition





09/08/22 09:37


Consult to Case Management [CONS] Routine 


   Services Needed at Discharge: Home O2











Primary care physician: 


CYRIL MOCK








Hospitalization


Reason for admission: shortness of breath


Condition: Serious


Hospital course: 





Assessment and plan: 


This is a 51-year-old female with known past medical history of ETOH abuse, 

liver cirrhosis s/p biliary stent placement, HTN, nicotine dependence, Guillan 

[place] Syndrome, IBS, and Obesity Hypoventilation Syndrome admitted for liver 

cirrhosis with ascites, acute hypoxic respiratory failure, and LLL pneumonia.





Hospital Course to Date:


9/3: Mentation improved, fully AAO this am. Desated in the 80s overnight, now 2L

 NC, no respiratory distress noted. Patient remains afebrile and VSS, cultures 

pending, continue empiric IV Abx. Anasarca and +4 pitting edema noted, X1 dose 

of low dose IV Lasix ordered, patient is on IV steroids. Continue PO lactulose 

and PPI. GI consult pending. Patient also reported that she has been bedbound 

since April of this year. D-dimer is elevated, will also check BLE doppler to 

r/o DVT. Continue to trend LFTs. Get records from PCP and Avinash Godinez.





9/4: imaging study from earlier reviewed, CT scan - abdomen: report reviewed 

(Mildly nodular liver, moderate ascites.  CBD stent in place.)


Patient still with increasing abdominal girth, GI input noted, patient started 

on Lasix and Rifaxmin. Will request records [facility] and [facility]. Plan for Paracentesis earliest possible time and rule out SBP. Will 

start on Empiric abx coverage for now. Sodium restrictions. Continue IMCU care 

at this time. Monitor Electrolytes with initiation of Diuretics. 





9/5: Remains on IV Lasix and PO aldactone, with adequate UOP. Still with 

significant pitting edema. Paracentesis pending. D/w GI, continue xifaxan, 

lactulose, and diuretics. Patient needs to follow-up with Dr. Young, 

[facility] in [place], for Biliary stent removal at discharge. 

Advance diet as tolerated. 





9/6: Awaiting paracentesis. Will follow up after completion.





9/7: Still hypoxic on 3l/min nc. Will need O2 eval. changed lasix po to Lasix IV

 x 3doses. Will reassess tomorrow. CXR ordered for AM. Hopefully d/c in next 24-

48 hrs.





9/8: Discharge home with home oxygen and home health. Discharge medication: 

spironalactone, lactulose, lasix, magnesium oxide, pantoprazole (e-rx) . 

Counseled on the need to follow up with hepatology (Dr Young at [facility]) as an outpatient. 





Assessment and Plan


#Acute Hypoxic Respiratory Failure


#LLL Pneumonia (CAP) ruled out


# Acute pulmonary edema


- Desated in the 80s overnight, now on 2L NC


- CXR suggesting left basilar pna on admission however doubt pna. Suspect more 

pulmonary edema


- COVID PCR negative


- off abx


- diuresis with lasix.


- Patient is afebrile, VSS, and cultures with NGTD


- Continue O2 supplementation and wean as tolerated


- Continue SPO2 monitoring for SPO2 goal above 92%


- F/U on cultures


- Daily CBC monitor


- CM to set up patient with home oxygen.


- will discharge patient home on diuretics.





#Alcoholic Cirrhosis of Liver with Ascites


#H/o Biliary Stentdue to Bile Leak


- Generalized anasarca and +4 pitting edema noted


- CTabd/pelvis reviewed


- On Xifaxan, Lactulose, IV lasix, and aldactone


- Continue PPI, taper off IV steroids


- GI consulted, appreciate recommendations


- abdominal US & paracentesis pending


- Continue to trend LFTs


- of noted,patient endorse that she has not drink for over a year and a half


- Patient needs to follow-up with Dr. Young, [facility] in 

[place], for Biliary stent removal at discharge.





#Acute Hepatic Encephalopathy


#Hyperammonemia 


#H/o ETOH Abuse


- Presented with AMS, ammonia level mildly elevated at 61


- Most likely related to above


- CT head/brain with no acute intracranial abnormality


- On PO Lactulose, mentation improved, Fully AAO this am


- Patient reported she has not drink any alcohol for a years and a half


- On Thiamine, folic acid, multivitamin daily, and PRN CIWA protocol


- Avoid delirium


- PRN Analgesia for pain control


- Maintenance of sleep-wake cycle





#Hyponatremia


#Fluid Overload


- On diuretics


- Monitor and replace electrolytes as needed


- Trend BMP





#Hyperglycemia


- Per patient no previous history of DM, probably due to IV steroids


- BG check and SSI initiated ACHS


- Avoid hypoglycemia





#Elevated D-Dimer


- COVID PCR pending


- Check BLE doppler to r/o DVT


- Heparin subQ





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


- Chronic, no active treatment at this time.  


- No clinical symptoms present at this time.  Continue supportive measures


- Outpatient neurology follow-up





#Nicotine Dependence


- Current daily 1 pack a day cigarettes smoker for over 20 years


- Smoking cessation education and quitline resources provided


- Patient denied any urges at this time, Nicotine patch as needed





#Obesity Hypoventilation Syndrome


- Balanced diet, increase physical activity discharge


- outpatient pulmonary follow-up for sleep study.  


- Noninvasive positive pressure ventilation as clinically indicated.





#GI/DVT Prophylaxis


- PPI- protonix


- Heparin SubQ





#Advance Care Planning 


- Disease education data, care plan, diagnoses, and prognosis were discussed 

with patient at the bedside. Patient is a FULL code.  Patient acknowledged 

understanding and agreed with current care plan.


Disposition: 06 HOME HEALTH CARE SERVICE


Final Discharge Diagnosis (Prints w/discharge instructions): Acute hypoxic 

respiratory failure, acute pulmonary edema, alcholic cirrhosis of liver ascites,

 biliary stent - due to bile leak, acute pulmonary edema.


Time spent for discharge: 35





Core Measure Documentation





- Palliative Care


Palliative Care/ Comfort Measures: Not Applicable





- Core Measures


Any of the following diagnoses?: none





Exam





- Physical Exam


Narrative exam: 





Physical Exam:


VITAL SIGNS:  Reviewed.    


GENERAL:  The patient appears normally developed, Vital signs as documented.


HEAD:  No signs of head trauma.


EYES:  Pupils are equal.  Extraocular motions intact.  


EARS:  Hearing grossly intact.


MOUTH:  Oropharynx is normal. 


NECK:  No adenopathy, no JVD.  


CHEST:  Chest with clear breath sounds bilaterally.  No wheezes, rales, or 

rhonchi.  


CARDIAC:  Regular rate and rhythm.  S1 and S2, without murmurs, gallops, or 

rubs.


VASCULAR:  No Edema.  Peripheral pulses normal and equal in all extremities.


ABDOMEN:  Soft, non tender and non distended.  No   rebound or guarding, and no 

masses palpated.   Bowel Sounds normal.


MUSCULOSKELETAL:  Good range of motion of all major joints. Extremities without 

clubbing, cyanosis or edema.  


NEUROLOGIC EXAM:  Alert and oriented x 4. no focal sensory or strength deficits.

  


PSYCHIATRIC:  Mood normal.


SKIN:  detail exam as documented in skin assessment





- Constitutional


Vitals: 


                                        











Temp Pulse Resp BP Pulse Ox


 


 97.6 F   120 H  18   116/70   97 


 


 09/08/22 04:00  09/08/22 10:17  09/08/22 08:00  09/08/22 10:17  09/08/22 08:00














Plan


Care Plan Goals: 


- Needs to follow-up with Dr. Young, [facility] in [place] for

 Biliary stent removal


Follow up with: 


CYRIL MOCK MD [Primary Care Provider] - 3-5 Days


Prescriptions: 


Spironolactone [Aldactone] 50 mg PO QDAY 30 Days #30 tablet


Lactulose [Cephulac] 20 gm PO QDAY 30 Days #1 bottle


Furosemide [Lasix TAB] 40 mg PO QDAY 30 Days #30 tablet


Magnesium Oxide [Mag-Ox] 400 mg PO DAILY 30 Days #30 tablet


Pantoprazole [Protonix TAB] 40 mg PO DAILY 30 Days #30 tablet

## 2022-09-08 NOTE — XRAY REPORT
CHEST 1 VIEW 9/8/2022 8:52 AM



INDICATION / CLINICAL INFORMATION: interstitial edema/HYPOXIC NEED 02.



COMPARISON: 9/2/2022



FINDINGS:



SUPPORT DEVICES: None.



HEART / MEDIASTINUM: No significant abnormality. 



LUNGS / PLEURA: Patchy bilateral lung opacities have developed, left greater than right. No consolida
tion, pleural effusion or pneumothorax. 



ADDITIONAL FINDINGS: No significant additional findings.



IMPRESSION:

1. Patchy bilateral lung opacities have developed concerning for atypical pneumonia. Correlate with t
he patient's clinical presentation.



Signer Name: Fortunato Marcos Jr, MD 

Signed: 9/8/2022 9:57 AM

Workstation Name: [id number]